# Patient Record
Sex: FEMALE | Race: BLACK OR AFRICAN AMERICAN | NOT HISPANIC OR LATINO | Employment: FULL TIME | URBAN - METROPOLITAN AREA
[De-identification: names, ages, dates, MRNs, and addresses within clinical notes are randomized per-mention and may not be internally consistent; named-entity substitution may affect disease eponyms.]

---

## 2021-11-05 ENCOUNTER — TRANSFERRED RECORDS (OUTPATIENT)
Dept: HEALTH INFORMATION MANAGEMENT | Facility: CLINIC | Age: 36
End: 2021-11-05

## 2021-11-05 LAB
HPV ABSTRACT: ABNORMAL
PAP-ABSTRACT: ABNORMAL

## 2022-02-10 LAB
HEP C HIM: NORMAL
HIV 1&2 EXT: NORMAL

## 2022-05-08 ENCOUNTER — TRANSFERRED RECORDS (OUTPATIENT)
Dept: MULTI SPECIALTY CLINIC | Facility: CLINIC | Age: 37
End: 2022-05-08

## 2022-05-08 LAB
ALT SERPL-CCNC: 11 U/L (ref 0–61)
AST SERPL-CCNC: 24 U/L (ref 5–34)
CREATININE (EXTERNAL): 0.8 MG/DL (ref 0.57–1.11)
GFR ESTIMATED (EXTERNAL): >90 ML/MIN/1.73M2
GLUCOSE (EXTERNAL): 84 MG/DL (ref 70–105)
POTASSIUM (EXTERNAL): 4.1 MMOL/L (ref 3.5–5.1)

## 2022-12-09 ENCOUNTER — TRANSFERRED RECORDS (OUTPATIENT)
Dept: HEALTH INFORMATION MANAGEMENT | Facility: CLINIC | Age: 37
End: 2022-12-09

## 2023-01-31 ENCOUNTER — TRANSFERRED RECORDS (OUTPATIENT)
Dept: HEALTH INFORMATION MANAGEMENT | Facility: CLINIC | Age: 38
End: 2023-01-31

## 2023-03-23 ENCOUNTER — VIRTUAL VISIT (OUTPATIENT)
Dept: EDUCATION SERVICES | Facility: CLINIC | Age: 38
End: 2023-03-23
Payer: COMMERCIAL

## 2023-03-23 ENCOUNTER — MEDICAL CORRESPONDENCE (OUTPATIENT)
Dept: HEALTH INFORMATION MANAGEMENT | Facility: CLINIC | Age: 38
End: 2023-03-23

## 2023-03-23 DIAGNOSIS — O24.419 GESTATIONAL DIABETES: Primary | ICD-10-CM

## 2023-03-23 PROCEDURE — G0108 DIAB MANAGE TRN  PER INDIV: HCPCS | Mod: GT | Performed by: DIETITIAN, REGISTERED

## 2023-03-23 NOTE — PROGRESS NOTES
Diabetes Self-Management Education & Support    SUBJECTIVE/OBJECTIVE:  Presents for education related to gestational diabetes via phone.  Accompanied by: Self  Gestational weeks: 25.5 week    Cultural Influences/Ethnic Background:  Not  or       Estimated Date of Delivery: Data Unavailable    1 hour OGTT  No results found for: GLU1  176    3 hour OGTT    Fasting  No results found for: GTTGF  93  1 hour  No results found for: GTTG1  219  2 hour  No results found for: GTTG2  186  3 hour  No results found for: GTTG3  72  Lifestyle and Health Behaviors:  Breakfast: usually she will have pc of toast with peanut butter or eggs with pc of toast 13 grams.  water.  she feels she goes too high if she has 30 grams.  snack: small apple 15-30 grams  Lunch: sandwich turkey two slices bread 26 grams, water, carrot on side.  was told 30 by [facility] educator.  Dinner: homemade tacos  or enchiladas.  chicken with salad with lots of veggies.  Snacks: evening snack: yogurt , cheese, fruit, peanuts, popcorn,  Other: on lantus 24 units at bedtime.  Has been on Lantus since february.  Just moved from [facility] and was following with diabetes education and provider there.      Current Management:   counting carbs, blood sugars, lantus 24 units at bedtime.    ASSESSMENT:  ASSESSMENT:  -pt just moved here from [facility] and her insurance is done the end of month and she is starting new job and insurance will start again in 1 month. Appt set for may 1 with diabetes education and she will call triage line if she has 3 or more elevated blood sugars or ketones in a week during the month of no insurance.  She has the triage phone number.  -she had gestational diabetes with first pregnancy as well.  Was on long acting insulin with that pregnancy as well.  Currently on Lantus 24 units at bedtime.  -Her numbers are all in range currently she stated under 90 in am and under 130 usually post meals.  -did have lower carb ranges that she  was given: was doing 15 grams bkfst and 30 at lunch, 45-60 supper.  She will work on 30 bkfst, 45-60 at lunch and supper and 15-30 at snacks. Making sure she eats the evening snack.        Educational topics covered today:  GDM diagnosis, pathophysiology, Risks and Complications of GDM, Means of controlling GDM, Blood Glucose Goals, Logging and Interpreting Glucose Results, Ketone Testing, When to Call a Diabetes Educator or OB Provider, Healthy Eating During Pregnancy, Counting Carbohydrates, Meal Planning for GDM, and Physical Activity    Educational materials provided today:   [place] Understanding Gestational Diabetes  GDM Log Book      Pt verbalized understanding of concepts discussed and recommendations provided today.     PLAN:  Check glucose 4 times daily, before breakfast and 1 hour after each meal.  Goals in am under 95 mg/dL, One hour post meal: under 140 mg/dL, if you miss the one hour a 2 hour would be under 120mg/dL.    Check Ketones daily for one week, if negative, reduce testing to once a week.     Physical activity recommended: try to do some walking right after meals if able.    Meal plan: 30 carbs at breakfast, 45-60 carbs at lunch, 45-60 carbs at supper, 15-30 carbs at 3 snacks a day.  Follow consistent CHO meal plan, eat CHO and protein/fat at all meals/snacks.    Call/[email or web address]-[email or web address] message diabetes educator if 3 or more blood sugars are above the goal in 1 week, if ketones are positive, or with questions/concerns.      Time Spent: 35 minutes  Encounter Type: Individual    Any diabetes medication dose changes were made via the CDE Protocol and Collaborative Practice Agreement with the patient's OB/GYN provider. A copy of this encounter was shared with the provider.

## 2023-03-23 NOTE — PATIENT INSTRUCTIONS
PLAN:  Check glucose 4 times daily, before breakfast and 1 hour after each meal.  Goals in am under 95 mg/dL, One hour post meal: under 140 mg/dL, if you miss the one hour a 2 hour would be under 120mg/dL.    Continue to give Lantus 24 units each night.    Check Ketones daily for one week, if negative, reduce testing to once a week.     Physical activity recommended: try to do some walking right after meals if able.    Meal plan: 30 carbs at breakfast, 45-60 carbs at lunch, 45-60 carbs at supper, 15-30 carbs at 3 snacks a day.  Follow consistent CHO meal plan, eat CHO and protein/fat at all meals/snacks.    Call/e-mail/Mr. Numbert message diabetes educator if 3 or more blood sugars are above the goal in 1 week, if ketones are positive, or with questions/concerns.  TRIAGE: [phone].      Here are the ideas for snacks:    Protein list (choose 2 = 14 g protein)  Carbohydrate list (choose 2 = 30 g carb*)   1 string cheese     1/2 whole grain English muffin  1/4 cup cottage cheese    1 slice whole grain bread  1 ounce cooked meat (the size    1/2 of a bun (hamburger or hot dog)  of a golf ball or meatball)   1 toaster waffle (no syrup)             1 ounce canned tuna or chicken   1/2 cup cooked oatmeal  2 breakfast sausage links    1/3 cup cooked brown rice or pasta  1 hot dog or veggie dog     5 Triscuit crackers  1.5 ounces cocktail shrimp (about   cup) 1/2 of a  Bagel Thin   no breading     1/2 of a whole wheat zehra  1 egg       1  6-inch flour tortilla  1/2 Boca or Sin original griller   1  6-inch or 2 4 -inch corn tortillas  burger (vegetarian/vegan)   1 fruit serving  3 ounces tofu     15 potato chips  1.5 ounces tempeh (about   cup)   10 corn tortilla chips  2 tablespoons nut butter    1/2 cup full fat ice cream  1/4 cup cup peanuts, almonds, pistachios,  5-6 ounces light or Greek yogurt  pumpkin seeds or 1/3 cup walnuts  3 cup popped popcorn  4 ounces (1/2 cup) [employer] brand milk  1/2 cup sweet  potato   8 ounces dairy milk    ~ *Check labels for total carb as flavors and brands may vary slightly.  ~ You can add a tablespoon of mayonnaise, mustard, cream cheese, margarine, sour cream, horseradish, carb-free salad dressing, hot sauce, or salsa for flavor.

## 2023-05-09 ENCOUNTER — TRANSCRIBE ORDERS (OUTPATIENT)
Dept: MATERNAL FETAL MEDICINE | Facility: CLINIC | Age: 38
End: 2023-05-09
Payer: COMMERCIAL

## 2023-05-09 ENCOUNTER — PRE VISIT (OUTPATIENT)
Dept: MATERNAL FETAL MEDICINE | Facility: CLINIC | Age: 38
End: 2023-05-09
Payer: COMMERCIAL

## 2023-05-09 DIAGNOSIS — O26.90 PREGNANCY RELATED CONDITION, ANTEPARTUM: Primary | ICD-10-CM

## 2023-05-16 ENCOUNTER — HOSPITAL ENCOUNTER (OUTPATIENT)
Dept: ULTRASOUND IMAGING | Facility: CLINIC | Age: 38
Discharge: HOME | End: 2023-05-16
Attending: STUDENT IN AN ORGANIZED HEALTH CARE EDUCATION/TRAINING PROGRAM
Payer: COMMERCIAL

## 2023-05-16 ENCOUNTER — OFFICE VISIT (OUTPATIENT)
Dept: MATERNAL FETAL MEDICINE | Facility: CLINIC | Age: 38
End: 2023-05-16
Attending: STUDENT IN AN ORGANIZED HEALTH CARE EDUCATION/TRAINING PROGRAM
Payer: COMMERCIAL

## 2023-05-16 DIAGNOSIS — O26.90 PREGNANCY RELATED CONDITION, ANTEPARTUM: ICD-10-CM

## 2023-05-16 DIAGNOSIS — O24.414 INSULIN CONTROLLED GESTATIONAL DIABETES MELLITUS (GDM) IN THIRD TRIMESTER: ICD-10-CM

## 2023-05-16 DIAGNOSIS — O36.5930 POOR CLINICAL FETAL GROWTH IN THIRD TRIMESTER, SINGLE OR UNSPECIFIED FETUS: Primary | ICD-10-CM

## 2023-05-16 PROCEDURE — 99203 OFFICE O/P NEW LOW 30 MIN: CPT | Mod: 25 | Performed by: OBSTETRICS & GYNECOLOGY

## 2023-05-16 PROCEDURE — 76819 FETAL BIOPHYS PROFIL W/O NST: CPT

## 2023-05-16 PROCEDURE — 76811 OB US DETAILED SNGL FETUS: CPT | Mod: 26 | Performed by: OBSTETRICS & GYNECOLOGY

## 2023-05-16 PROCEDURE — 76819 FETAL BIOPHYS PROFIL W/O NST: CPT | Mod: 26 | Performed by: OBSTETRICS & GYNECOLOGY

## 2023-05-16 PROCEDURE — 76811 OB US DETAILED SNGL FETUS: CPT

## 2023-05-16 NOTE — NURSING NOTE
Patient presents to M for L2. Positive fetal movement. Denies LOF, vaginal bleeding or cramping/contractions. SBAR given to MFMARILU MD, see their note in Epic.

## 2023-05-16 NOTE — PROGRESS NOTES
The patient was seen for an ultrasound in the M[facility]etal [facility] at the [facility] today.  For a detailed report of the ultrasound examination, please see the ultrasound report which can be found under the imaging tab.    If you have questions regarding today's evaluation or if we can be of further service, please contact the Maternal-Fetal Medicine Center.    Kenya Tsang MD  , OB/GYN  Maternal-Fetal Medicine  [phone] (Pager)

## 2023-05-21 ENCOUNTER — HEALTH MAINTENANCE LETTER (OUTPATIENT)
Age: 38
End: 2023-05-21

## 2023-06-08 ENCOUNTER — TELEPHONE (OUTPATIENT)
Dept: EDUCATION SERVICES | Facility: CLINIC | Age: 38
End: 2023-06-08
Payer: COMMERCIAL

## 2023-06-08 NOTE — TELEPHONE ENCOUNTER
Called patient to review BG, no answer. Left message with triage line and will send another TotalTakeoutt Message. We have reached out to this patient several times with no response, this will be our last outreach attempt but will gladly reconnect when patient is ready. Sending a message to OB team today (See communications).     PATY Mohan [facility]  Triage: [phone]  [phone]

## 2023-06-08 NOTE — LETTER
2023         RE: Mihaela Cespedes  [address]  [place] [facility] [place] [address]        Dr. Khan,    Just wanted you to be aware that we have not be able to connect with Mihaela for blood sugar review. We have reached out many times with no success. We will not be reaching out again, but will be ready to reconnect when the patient is ready.     Thanks,   PATY Mohan [facility]  Triage: [phone]  [phone]

## 2023-06-13 ENCOUNTER — HOSPITAL ENCOUNTER (OUTPATIENT)
Dept: ULTRASOUND IMAGING | Facility: CLINIC | Age: 38
Discharge: HOME | End: 2023-06-13
Attending: OBSTETRICS & GYNECOLOGY
Payer: COMMERCIAL

## 2023-06-13 ENCOUNTER — OFFICE VISIT (OUTPATIENT)
Dept: MATERNAL FETAL MEDICINE | Facility: CLINIC | Age: 38
End: 2023-06-13
Attending: OBSTETRICS & GYNECOLOGY
Payer: COMMERCIAL

## 2023-06-13 DIAGNOSIS — O10.913 CHRONIC HYPERTENSION IN OBSTETRIC CONTEXT, THIRD TRIMESTER: ICD-10-CM

## 2023-06-13 DIAGNOSIS — O24.414 INSULIN CONTROLLED WHITE CLASSIFICATION A2 GESTATIONAL DIABETES MELLITUS (GDM): ICD-10-CM

## 2023-06-13 DIAGNOSIS — O36.5930 POOR CLINICAL FETAL GROWTH IN THIRD TRIMESTER, SINGLE OR UNSPECIFIED FETUS: ICD-10-CM

## 2023-06-13 DIAGNOSIS — O99.213 MATERNAL OBESITY SYNDROME IN THIRD TRIMESTER: ICD-10-CM

## 2023-06-13 DIAGNOSIS — O09.523 MULTIGRAVIDA OF ADVANCED MATERNAL AGE IN THIRD TRIMESTER: Primary | ICD-10-CM

## 2023-06-13 PROCEDURE — 76816 OB US FOLLOW-UP PER FETUS: CPT

## 2023-06-13 PROCEDURE — 76819 FETAL BIOPHYS PROFIL W/O NST: CPT

## 2023-06-13 PROCEDURE — 76816 OB US FOLLOW-UP PER FETUS: CPT | Mod: 26 | Performed by: OBSTETRICS & GYNECOLOGY

## 2023-06-13 PROCEDURE — 76819 FETAL BIOPHYS PROFIL W/O NST: CPT | Mod: 26 | Performed by: OBSTETRICS & GYNECOLOGY

## 2023-06-13 NOTE — NURSING NOTE
Patient presents to [facility] for RL2/BPP at 37 weeks due to GDM A2, cHTN, and EFW 14% on previous US. Positive fetal movement. Denies LOF, vaginal bleeding or cramping/contractions. Patient states BS levels are within normal limits. SBAR given to M MD, see their note in Epic.

## 2023-06-13 NOTE — PROGRESS NOTES
Please refer to ultrasound report under 'Imaging' Studies of 'Chart Review' tabs.    Fabiano Chin M.D.

## 2023-07-03 ENCOUNTER — HOSPITAL ENCOUNTER (INPATIENT)
Facility: CLINIC | Age: 38
LOS: 3 days | Discharge: HOME | End: 2023-07-06
Attending: OBSTETRICS & GYNECOLOGY | Admitting: OBSTETRICS & GYNECOLOGY
Payer: COMMERCIAL

## 2023-07-03 DIAGNOSIS — O11.9 CHRONIC HYPERTENSION WITH SUPERIMPOSED PREECLAMPSIA: ICD-10-CM

## 2023-07-03 DIAGNOSIS — J06.9 UPPER RESPIRATORY TRACT INFECTION, UNSPECIFIED TYPE: ICD-10-CM

## 2023-07-03 PROBLEM — Z36.89 ENCOUNTER FOR TRIAGE IN PREGNANT PATIENT: Status: ACTIVE | Noted: 2023-07-03

## 2023-07-03 LAB
ABO/RH(D): NORMAL
ALBUMIN SERPL BCG-MCNC: 3.3 G/DL (ref 3.5–5.2)
ALP SERPL-CCNC: 112 U/L (ref 35–104)
ALT SERPL W P-5'-P-CCNC: 9 U/L (ref 0–50)
ANION GAP SERPL CALCULATED.3IONS-SCNC: 12 MMOL/L (ref 7–15)
ANTIBODY SCREEN: NEGATIVE
AST SERPL W P-5'-P-CCNC: 15 U/L (ref 0–45)
B-OH-BUTYR SERPL-SCNC: <0.18 MMOL/L
BILIRUB SERPL-MCNC: 0.2 MG/DL
BUN SERPL-MCNC: 9.8 MG/DL (ref 6–20)
CALCIUM SERPL-MCNC: 9.4 MG/DL (ref 8.6–10)
CHLORIDE SERPL-SCNC: 102 MMOL/L (ref 98–107)
CREAT SERPL-MCNC: 0.76 MG/DL (ref 0.51–0.95)
DEPRECATED HCO3 PLAS-SCNC: 21 MMOL/L (ref 22–29)
ERYTHROCYTE [DISTWIDTH] IN BLOOD BY AUTOMATED COUNT: 12.9 % (ref 10–15)
GFR SERPL CREATININE-BSD FRML MDRD: >90 ML/MIN/1.73M2
GLUCOSE BLDC GLUCOMTR-MCNC: 114 MG/DL (ref 70–99)
GLUCOSE SERPL-MCNC: 85 MG/DL (ref 70–99)
HCT VFR BLD AUTO: 36.1 % (ref 35–47)
HGB BLD-MCNC: 12 G/DL (ref 11.7–15.7)
MCH RBC QN AUTO: 27.4 PG (ref 26.5–33)
MCHC RBC AUTO-ENTMCNC: 33.2 G/DL (ref 31.5–36.5)
MCV RBC AUTO: 82 FL (ref 78–100)
PLATELET # BLD AUTO: 259 10E3/UL (ref 150–450)
POTASSIUM SERPL-SCNC: 3.9 MMOL/L (ref 3.4–5.3)
PROT SERPL-MCNC: 7.2 G/DL (ref 6.4–8.3)
RBC # BLD AUTO: 4.38 10E6/UL (ref 3.8–5.2)
SODIUM SERPL-SCNC: 135 MMOL/L (ref 136–145)
SPECIMEN EXPIRATION DATE: NORMAL
WBC # BLD AUTO: 9.8 10E3/UL (ref 4–11)

## 2023-07-03 PROCEDURE — 250N000011 HC RX IP 250 OP 636: Mod: JZ | Performed by: OBSTETRICS & GYNECOLOGY

## 2023-07-03 PROCEDURE — 80053 COMPREHEN METABOLIC PANEL: CPT | Performed by: OBSTETRICS & GYNECOLOGY

## 2023-07-03 PROCEDURE — 86850 RBC ANTIBODY SCREEN: CPT | Performed by: OBSTETRICS & GYNECOLOGY

## 2023-07-03 PROCEDURE — 86901 BLOOD TYPING SEROLOGIC RH(D): CPT | Performed by: OBSTETRICS & GYNECOLOGY

## 2023-07-03 PROCEDURE — 85027 COMPLETE CBC AUTOMATED: CPT | Performed by: OBSTETRICS & GYNECOLOGY

## 2023-07-03 PROCEDURE — 250N000011 HC RX IP 250 OP 636: Mod: JZ

## 2023-07-03 PROCEDURE — 82010 KETONE BODYS QUAN: CPT | Performed by: OBSTETRICS & GYNECOLOGY

## 2023-07-03 PROCEDURE — 86780 TREPONEMA PALLIDUM: CPT | Performed by: OBSTETRICS & GYNECOLOGY

## 2023-07-03 PROCEDURE — 120N000001 HC R&B MED SURG/OB

## 2023-07-03 PROCEDURE — 36415 COLL VENOUS BLD VENIPUNCTURE: CPT | Performed by: OBSTETRICS & GYNECOLOGY

## 2023-07-03 PROCEDURE — 258N000003 HC RX IP 258 OP 636: Performed by: OBSTETRICS & GYNECOLOGY

## 2023-07-03 PROCEDURE — G0463 HOSPITAL OUTPT CLINIC VISIT: HCPCS

## 2023-07-03 PROCEDURE — 99207 PR NO CHARGE LOS: CPT | Performed by: PHYSICIAN ASSISTANT

## 2023-07-03 RX ORDER — SODIUM CHLORIDE, SODIUM LACTATE, POTASSIUM CHLORIDE, CALCIUM CHLORIDE 600; 310; 30; 20 MG/100ML; MG/100ML; MG/100ML; MG/100ML
INJECTION, SOLUTION INTRAVENOUS CONTINUOUS
Status: DISCONTINUED | OUTPATIENT
Start: 2023-07-03 | End: 2023-07-04 | Stop reason: HOSPADM

## 2023-07-03 RX ORDER — OXYTOCIN 10 [USP'U]/ML
10 INJECTION, SOLUTION INTRAMUSCULAR; INTRAVENOUS
Status: DISCONTINUED | OUTPATIENT
Start: 2023-07-03 | End: 2023-07-04 | Stop reason: HOSPADM

## 2023-07-03 RX ORDER — NICOTINE POLACRILEX 4 MG
15-30 LOZENGE BUCCAL
Status: DISCONTINUED | OUTPATIENT
Start: 2023-07-03 | End: 2023-07-04 | Stop reason: HOSPADM

## 2023-07-03 RX ORDER — FENTANYL CITRATE 50 UG/ML
100 INJECTION, SOLUTION INTRAMUSCULAR; INTRAVENOUS
Status: DISCONTINUED | OUTPATIENT
Start: 2023-07-03 | End: 2023-07-04 | Stop reason: HOSPADM

## 2023-07-03 RX ORDER — ONDANSETRON 2 MG/ML
4 INJECTION INTRAMUSCULAR; INTRAVENOUS EVERY 6 HOURS PRN
Status: DISCONTINUED | OUTPATIENT
Start: 2023-07-03 | End: 2023-07-04 | Stop reason: HOSPADM

## 2023-07-03 RX ORDER — OXYTOCIN/0.9 % SODIUM CHLORIDE 30/500 ML
340 PLASTIC BAG, INJECTION (ML) INTRAVENOUS CONTINUOUS PRN
Status: DISCONTINUED | OUTPATIENT
Start: 2023-07-03 | End: 2023-07-04 | Stop reason: HOSPADM

## 2023-07-03 RX ORDER — LIDOCAINE 40 MG/G
CREAM TOPICAL
Status: DISCONTINUED | OUTPATIENT
Start: 2023-07-03 | End: 2023-07-04 | Stop reason: HOSPADM

## 2023-07-03 RX ORDER — CALCIUM GLUCONATE 94 MG/ML
1 INJECTION, SOLUTION INTRAVENOUS
Status: DISCONTINUED | OUTPATIENT
Start: 2023-07-03 | End: 2023-07-06 | Stop reason: HOSPADM

## 2023-07-03 RX ORDER — METOCLOPRAMIDE HYDROCHLORIDE 5 MG/ML
10 INJECTION INTRAMUSCULAR; INTRAVENOUS EVERY 6 HOURS PRN
Status: DISCONTINUED | OUTPATIENT
Start: 2023-07-03 | End: 2023-07-04 | Stop reason: HOSPADM

## 2023-07-03 RX ORDER — KETOROLAC TROMETHAMINE 30 MG/ML
30 INJECTION, SOLUTION INTRAMUSCULAR; INTRAVENOUS
Status: DISCONTINUED | OUTPATIENT
Start: 2023-07-03 | End: 2023-07-04

## 2023-07-03 RX ORDER — ASPIRIN 81 MG/1
162 TABLET ORAL DAILY
Status: ON HOLD | COMMUNITY
End: 2023-07-06

## 2023-07-03 RX ORDER — NALOXONE HYDROCHLORIDE 0.4 MG/ML
0.4 INJECTION, SOLUTION INTRAMUSCULAR; INTRAVENOUS; SUBCUTANEOUS
Status: DISCONTINUED | OUTPATIENT
Start: 2023-07-03 | End: 2023-07-04 | Stop reason: HOSPADM

## 2023-07-03 RX ORDER — MAGNESIUM SULFATE IN WATER 40 MG/ML
2 INJECTION, SOLUTION INTRAVENOUS CONTINUOUS
Status: DISCONTINUED | OUTPATIENT
Start: 2023-07-03 | End: 2023-07-04

## 2023-07-03 RX ORDER — ONDANSETRON 4 MG/1
4 TABLET, ORALLY DISINTEGRATING ORAL EVERY 6 HOURS PRN
Status: DISCONTINUED | OUTPATIENT
Start: 2023-07-03 | End: 2023-07-04 | Stop reason: HOSPADM

## 2023-07-03 RX ORDER — PROCHLORPERAZINE MALEATE 5 MG
10 TABLET ORAL EVERY 6 HOURS PRN
Status: DISCONTINUED | OUTPATIENT
Start: 2023-07-03 | End: 2023-07-04 | Stop reason: HOSPADM

## 2023-07-03 RX ORDER — OXYTOCIN/0.9 % SODIUM CHLORIDE 30/500 ML
100-340 PLASTIC BAG, INJECTION (ML) INTRAVENOUS CONTINUOUS PRN
Status: DISCONTINUED | OUTPATIENT
Start: 2023-07-03 | End: 2023-07-04

## 2023-07-03 RX ORDER — LABETALOL HYDROCHLORIDE 5 MG/ML
INJECTION, SOLUTION INTRAVENOUS
Status: COMPLETED
Start: 2023-07-03 | End: 2023-07-03

## 2023-07-03 RX ORDER — MISOPROSTOL 200 UG/1
400 TABLET ORAL
Status: DISCONTINUED | OUTPATIENT
Start: 2023-07-03 | End: 2023-07-04 | Stop reason: HOSPADM

## 2023-07-03 RX ORDER — LABETALOL HYDROCHLORIDE 5 MG/ML
20-80 INJECTION, SOLUTION INTRAVENOUS EVERY 10 MIN PRN
Status: DISCONTINUED | OUTPATIENT
Start: 2023-07-03 | End: 2023-07-06 | Stop reason: HOSPADM

## 2023-07-03 RX ORDER — MAGNESIUM SULFATE HEPTAHYDRATE 40 MG/ML
4 INJECTION, SOLUTION INTRAVENOUS ONCE
Status: COMPLETED | OUTPATIENT
Start: 2023-07-03 | End: 2023-07-03

## 2023-07-03 RX ORDER — METOCLOPRAMIDE 10 MG/1
10 TABLET ORAL EVERY 6 HOURS PRN
Status: DISCONTINUED | OUTPATIENT
Start: 2023-07-03 | End: 2023-07-04 | Stop reason: HOSPADM

## 2023-07-03 RX ORDER — OXYTOCIN 10 [USP'U]/ML
10 INJECTION, SOLUTION INTRAMUSCULAR; INTRAVENOUS
Status: DISCONTINUED | OUTPATIENT
Start: 2023-07-03 | End: 2023-07-04

## 2023-07-03 RX ORDER — DEXTROSE MONOHYDRATE 25 G/50ML
25-50 INJECTION, SOLUTION INTRAVENOUS
Status: DISCONTINUED | OUTPATIENT
Start: 2023-07-03 | End: 2023-07-04 | Stop reason: HOSPADM

## 2023-07-03 RX ORDER — HYDRALAZINE HYDROCHLORIDE 20 MG/ML
10 INJECTION INTRAMUSCULAR; INTRAVENOUS
Status: DISCONTINUED | OUTPATIENT
Start: 2023-07-03 | End: 2023-07-06 | Stop reason: HOSPADM

## 2023-07-03 RX ORDER — IBUPROFEN 400 MG/1
800 TABLET, FILM COATED ORAL
Status: DISCONTINUED | OUTPATIENT
Start: 2023-07-03 | End: 2023-07-04

## 2023-07-03 RX ORDER — PRENATAL VIT/IRON FUM/FOLIC AC 27MG-0.8MG
TABLET ORAL DAILY
COMMUNITY

## 2023-07-03 RX ORDER — CITRIC ACID/SODIUM CITRATE 334-500MG
30 SOLUTION, ORAL ORAL
Status: DISCONTINUED | OUTPATIENT
Start: 2023-07-03 | End: 2023-07-04 | Stop reason: HOSPADM

## 2023-07-03 RX ORDER — SODIUM CHLORIDE 9 MG/ML
INJECTION, SOLUTION INTRAVENOUS CONTINUOUS
Status: DISCONTINUED | OUTPATIENT
Start: 2023-07-03 | End: 2023-07-04 | Stop reason: HOSPADM

## 2023-07-03 RX ORDER — CARBOPROST TROMETHAMINE 250 UG/ML
250 INJECTION, SOLUTION INTRAMUSCULAR
Status: DISCONTINUED | OUTPATIENT
Start: 2023-07-03 | End: 2023-07-04 | Stop reason: HOSPADM

## 2023-07-03 RX ORDER — NALOXONE HYDROCHLORIDE 0.4 MG/ML
0.2 INJECTION, SOLUTION INTRAMUSCULAR; INTRAVENOUS; SUBCUTANEOUS
Status: DISCONTINUED | OUTPATIENT
Start: 2023-07-03 | End: 2023-07-04 | Stop reason: HOSPADM

## 2023-07-03 RX ORDER — TRANEXAMIC ACID 10 MG/ML
1 INJECTION, SOLUTION INTRAVENOUS EVERY 30 MIN PRN
Status: DISCONTINUED | OUTPATIENT
Start: 2023-07-03 | End: 2023-07-04 | Stop reason: HOSPADM

## 2023-07-03 RX ORDER — MISOPROSTOL 200 UG/1
800 TABLET ORAL
Status: DISCONTINUED | OUTPATIENT
Start: 2023-07-03 | End: 2023-07-04 | Stop reason: HOSPADM

## 2023-07-03 RX ORDER — PROCHLORPERAZINE 25 MG
25 SUPPOSITORY, RECTAL RECTAL EVERY 12 HOURS PRN
Status: DISCONTINUED | OUTPATIENT
Start: 2023-07-03 | End: 2023-07-04 | Stop reason: HOSPADM

## 2023-07-03 RX ORDER — METHYLERGONOVINE MALEATE 0.2 MG/ML
200 INJECTION INTRAVENOUS
Status: DISCONTINUED | OUTPATIENT
Start: 2023-07-03 | End: 2023-07-04 | Stop reason: HOSPADM

## 2023-07-03 RX ORDER — MAGNESIUM SULFATE HEPTAHYDRATE 40 MG/ML
2 INJECTION, SOLUTION INTRAVENOUS
Status: DISCONTINUED | OUTPATIENT
Start: 2023-07-03 | End: 2023-07-06 | Stop reason: HOSPADM

## 2023-07-03 RX ORDER — LIDOCAINE 40 MG/G
CREAM TOPICAL
Status: DISCONTINUED | OUTPATIENT
Start: 2023-07-03 | End: 2023-07-03 | Stop reason: HOSPADM

## 2023-07-03 RX ADMIN — LABETALOL HYDROCHLORIDE 20 MG: 5 INJECTION, SOLUTION INTRAVENOUS at 18:44

## 2023-07-03 RX ADMIN — MAGNESIUM SULFATE HEPTAHYDRATE 4 G: 40 INJECTION, SOLUTION INTRAVENOUS at 18:50

## 2023-07-03 RX ADMIN — MAGNESIUM SULFATE IN WATER 2 G/HR: 40 INJECTION, SOLUTION INTRAVENOUS at 19:20

## 2023-07-03 RX ADMIN — SODIUM CHLORIDE, POTASSIUM CHLORIDE, SODIUM LACTATE AND CALCIUM CHLORIDE: 600; 310; 30; 20 INJECTION, SOLUTION INTRAVENOUS at 19:20

## 2023-07-03 ASSESSMENT — ACTIVITIES OF DAILY LIVING (ADL)
ADLS_ACUITY_SCORE: 20
ADLS_ACUITY_SCORE: 35
ADLS_ACUITY_SCORE: 20
ADLS_ACUITY_SCORE: 20

## 2023-07-03 NOTE — PLAN OF CARE
Data: Patient admitted to room 2220 at 1745. Patient is a . Prenatal record reviewed.   OB History    Para Term  AB Living   2 1 1 0 0 1   SAB IAB Ectopic Multiple Live Births   0 0 0 0 1      # Outcome Date GA Lbr Randolph/2nd Weight Sex Delivery Anes PTL Lv   2 Current            1 Term 22     Vag-Spont   GUSTAVO   .  Medical History:   Past Medical History:   Diagnosis Date     Diabetes (H)      Hypertension    .  Gestational age 39w6d. Vital signs per doc flowsheet. Fetal movement present. Patient reports Rule Out Labor   as reason for admission. Support persons Ang present.  Action: Report from Francy GONZALEZ RN obtained at 1745. Care of patient assumed at 1745. Verbal consent for EFM, external fetal monitors applied. Admission assessment completed. Patient and support persons educated on labor process. Patient instructed to report change in fetal movement, contractions, vaginal leaking of fluid or bleeding, abdominal pain, or any concerns related to the pregnancy to her nurse/physician. Patient oriented to room, call light in reach.   Response: Dr. Espinosa informed of admission. Plan per provider is continue to labor. Patient verbalized understanding of education and verbalized agreement with plan. Patient coping with labor via Breathing thru contractions , epidural if needed.    Goal Outcome Evaluation:

## 2023-07-03 NOTE — PLAN OF CARE
Pt is a  at 39.6 weeks who presents vida every 5 min since 1400 today.  Pt states the contractions began at 0400 but became regular and 5 min apart at 1400. Pt is breathing through contractions.  Denies any leaking of fluid or vaginal bleeding.  +FM.  Monitors applied after obtaining verbal consent.  Pt is gestational diabetic taking lantus at bedtime.  States her fasting BS are 70-80's in am and post meals are 120's.  Pt also has gestational hypertension, 140/90 and 130's/80 upon arrival, Dr Espinosa aware, orders received for admission.  Pt is 4-5/80/-2 with a bulging bag.  Plan to admit pt to 220 for further labor cares.  Report given to Kassy GARCIA RN at 1740.

## 2023-07-04 ENCOUNTER — ANESTHESIA (OUTPATIENT)
Dept: OBGYN | Facility: CLINIC | Age: 38
End: 2023-07-04
Payer: COMMERCIAL

## 2023-07-04 ENCOUNTER — ANESTHESIA EVENT (OUTPATIENT)
Dept: OBGYN | Facility: CLINIC | Age: 38
End: 2023-07-04
Payer: COMMERCIAL

## 2023-07-04 LAB
GLUCOSE BLDC GLUCOMTR-MCNC: 114 MG/DL (ref 70–99)
GLUCOSE BLDC GLUCOMTR-MCNC: 119 MG/DL (ref 70–99)
GLUCOSE BLDC GLUCOMTR-MCNC: 124 MG/DL (ref 70–99)
GLUCOSE BLDC GLUCOMTR-MCNC: 127 MG/DL (ref 70–99)
GLUCOSE BLDC GLUCOMTR-MCNC: 98 MG/DL (ref 70–99)

## 2023-07-04 PROCEDURE — 722N000001 HC LABOR CARE VAGINAL DELIVERY SINGLE

## 2023-07-04 PROCEDURE — 120N000012 HC R&B POSTPARTUM

## 2023-07-04 PROCEDURE — 99207 PR NO CHARGE LOS: CPT

## 2023-07-04 PROCEDURE — 10907ZC DRAINAGE OF AMNIOTIC FLUID, THERAPEUTIC FROM PRODUCTS OF CONCEPTION, VIA NATURAL OR ARTIFICIAL OPENING: ICD-10-PCS | Performed by: OBSTETRICS & GYNECOLOGY

## 2023-07-04 PROCEDURE — 258N000003 HC RX IP 258 OP 636: Performed by: ANESTHESIOLOGY

## 2023-07-04 PROCEDURE — 250N000011 HC RX IP 250 OP 636: Mod: JZ | Performed by: OBSTETRICS & GYNECOLOGY

## 2023-07-04 PROCEDURE — 250N000013 HC RX MED GY IP 250 OP 250 PS 637: Performed by: OBSTETRICS & GYNECOLOGY

## 2023-07-04 PROCEDURE — 250N000011 HC RX IP 250 OP 636: Performed by: ANESTHESIOLOGY

## 2023-07-04 PROCEDURE — 3E0R3BZ INTRODUCTION OF ANESTHETIC AGENT INTO SPINAL CANAL, PERCUTANEOUS APPROACH: ICD-10-PCS | Performed by: OBSTETRICS & GYNECOLOGY

## 2023-07-04 PROCEDURE — 00HU33Z INSERTION OF INFUSION DEVICE INTO SPINAL CANAL, PERCUTANEOUS APPROACH: ICD-10-PCS | Performed by: OBSTETRICS & GYNECOLOGY

## 2023-07-04 PROCEDURE — 258N000003 HC RX IP 258 OP 636: Performed by: OBSTETRICS & GYNECOLOGY

## 2023-07-04 PROCEDURE — 250N000012 HC RX MED GY IP 250 OP 636 PS 637: Performed by: OBSTETRICS & GYNECOLOGY

## 2023-07-04 PROCEDURE — 250N000009 HC RX 250: Performed by: ANESTHESIOLOGY

## 2023-07-04 PROCEDURE — 370N000003 HC ANESTHESIA WARD SERVICE: Performed by: ANESTHESIOLOGY

## 2023-07-04 PROCEDURE — 0KQM0ZZ REPAIR PERINEUM MUSCLE, OPEN APPROACH: ICD-10-PCS | Performed by: OBSTETRICS & GYNECOLOGY

## 2023-07-04 PROCEDURE — 250N000009 HC RX 250: Performed by: OBSTETRICS & GYNECOLOGY

## 2023-07-04 PROCEDURE — 250N000013 HC RX MED GY IP 250 OP 250 PS 637: Performed by: STUDENT IN AN ORGANIZED HEALTH CARE EDUCATION/TRAINING PROGRAM

## 2023-07-04 RX ORDER — HYDROCORTISONE 25 MG/G
CREAM TOPICAL 3 TIMES DAILY PRN
Status: DISCONTINUED | OUTPATIENT
Start: 2023-07-04 | End: 2023-07-06 | Stop reason: HOSPADM

## 2023-07-04 RX ORDER — DEXTROSE MONOHYDRATE 25 G/50ML
25-50 INJECTION, SOLUTION INTRAVENOUS
Status: DISCONTINUED | OUTPATIENT
Start: 2023-07-04 | End: 2023-07-06 | Stop reason: HOSPADM

## 2023-07-04 RX ORDER — OXYTOCIN 10 [USP'U]/ML
10 INJECTION, SOLUTION INTRAMUSCULAR; INTRAVENOUS
Status: DISCONTINUED | OUTPATIENT
Start: 2023-07-04 | End: 2023-07-06 | Stop reason: HOSPADM

## 2023-07-04 RX ORDER — SODIUM CHLORIDE, SODIUM LACTATE, POTASSIUM CHLORIDE, CALCIUM CHLORIDE 600; 310; 30; 20 MG/100ML; MG/100ML; MG/100ML; MG/100ML
INJECTION, SOLUTION INTRAVENOUS CONTINUOUS
Status: DISCONTINUED | OUTPATIENT
Start: 2023-07-04 | End: 2023-07-06 | Stop reason: HOSPADM

## 2023-07-04 RX ORDER — NIFEDIPINE 30 MG/1
30 TABLET, EXTENDED RELEASE ORAL DAILY
Status: DISCONTINUED | OUTPATIENT
Start: 2023-07-04 | End: 2023-07-06 | Stop reason: HOSPADM

## 2023-07-04 RX ORDER — FENTANYL CITRATE-0.9 % NACL/PF 10 MCG/ML
100 PLASTIC BAG, INJECTION (ML) INTRAVENOUS EVERY 5 MIN PRN
Status: COMPLETED | OUTPATIENT
Start: 2023-07-04 | End: 2023-07-04

## 2023-07-04 RX ORDER — BISACODYL 10 MG
10 SUPPOSITORY, RECTAL RECTAL DAILY PRN
Status: DISCONTINUED | OUTPATIENT
Start: 2023-07-04 | End: 2023-07-06 | Stop reason: HOSPADM

## 2023-07-04 RX ORDER — NICOTINE POLACRILEX 4 MG
15-30 LOZENGE BUCCAL
Status: DISCONTINUED | OUTPATIENT
Start: 2023-07-04 | End: 2023-07-04

## 2023-07-04 RX ORDER — NALBUPHINE HYDROCHLORIDE 20 MG/ML
2.5-5 INJECTION, SOLUTION INTRAMUSCULAR; INTRAVENOUS; SUBCUTANEOUS EVERY 6 HOURS PRN
Status: DISCONTINUED | OUTPATIENT
Start: 2023-07-04 | End: 2023-07-06 | Stop reason: HOSPADM

## 2023-07-04 RX ORDER — NICOTINE POLACRILEX 4 MG
15-30 LOZENGE BUCCAL
Status: DISCONTINUED | OUTPATIENT
Start: 2023-07-04 | End: 2023-07-06 | Stop reason: HOSPADM

## 2023-07-04 RX ORDER — MAGNESIUM SULFATE IN WATER 40 MG/ML
2 INJECTION, SOLUTION INTRAVENOUS CONTINUOUS
Status: DISPENSED | OUTPATIENT
Start: 2023-07-04 | End: 2023-07-05

## 2023-07-04 RX ORDER — METHYLERGONOVINE MALEATE 0.2 MG/ML
200 INJECTION INTRAVENOUS
Status: DISCONTINUED | OUTPATIENT
Start: 2023-07-04 | End: 2023-07-06 | Stop reason: HOSPADM

## 2023-07-04 RX ORDER — OXYTOCIN/0.9 % SODIUM CHLORIDE 30/500 ML
340 PLASTIC BAG, INJECTION (ML) INTRAVENOUS CONTINUOUS PRN
Status: DISCONTINUED | OUTPATIENT
Start: 2023-07-04 | End: 2023-07-06 | Stop reason: HOSPADM

## 2023-07-04 RX ORDER — TRANEXAMIC ACID 10 MG/ML
1 INJECTION, SOLUTION INTRAVENOUS EVERY 30 MIN PRN
Status: DISCONTINUED | OUTPATIENT
Start: 2023-07-04 | End: 2023-07-06 | Stop reason: HOSPADM

## 2023-07-04 RX ORDER — MODIFIED LANOLIN
OINTMENT (GRAM) TOPICAL
Status: DISCONTINUED | OUTPATIENT
Start: 2023-07-04 | End: 2023-07-06 | Stop reason: HOSPADM

## 2023-07-04 RX ORDER — ACETAMINOPHEN 325 MG/1
650 TABLET ORAL EVERY 4 HOURS PRN
Status: DISCONTINUED | OUTPATIENT
Start: 2023-07-04 | End: 2023-07-06 | Stop reason: HOSPADM

## 2023-07-04 RX ORDER — DEXTROSE MONOHYDRATE 25 G/50ML
25-50 INJECTION, SOLUTION INTRAVENOUS
Status: DISCONTINUED | OUTPATIENT
Start: 2023-07-04 | End: 2023-07-04

## 2023-07-04 RX ORDER — IBUPROFEN 400 MG/1
800 TABLET, FILM COATED ORAL EVERY 6 HOURS PRN
Status: DISCONTINUED | OUTPATIENT
Start: 2023-07-04 | End: 2023-07-06 | Stop reason: HOSPADM

## 2023-07-04 RX ORDER — DOCUSATE SODIUM 100 MG/1
100 CAPSULE, LIQUID FILLED ORAL DAILY
Status: DISCONTINUED | OUTPATIENT
Start: 2023-07-04 | End: 2023-07-06 | Stop reason: HOSPADM

## 2023-07-04 RX ORDER — ONDANSETRON 4 MG/1
4 TABLET, ORALLY DISINTEGRATING ORAL EVERY 6 HOURS PRN
Status: DISCONTINUED | OUTPATIENT
Start: 2023-07-04 | End: 2023-07-06 | Stop reason: HOSPADM

## 2023-07-04 RX ORDER — EPHEDRINE SULFATE 50 MG/ML
5 INJECTION, SOLUTION INTRAMUSCULAR; INTRAVENOUS; SUBCUTANEOUS
Status: DISCONTINUED | OUTPATIENT
Start: 2023-07-04 | End: 2023-07-06 | Stop reason: HOSPADM

## 2023-07-04 RX ORDER — OXYTOCIN/0.9 % SODIUM CHLORIDE 30/500 ML
1-24 PLASTIC BAG, INJECTION (ML) INTRAVENOUS CONTINUOUS
Status: DISCONTINUED | OUTPATIENT
Start: 2023-07-04 | End: 2023-07-04 | Stop reason: HOSPADM

## 2023-07-04 RX ORDER — ONDANSETRON 2 MG/ML
4 INJECTION INTRAMUSCULAR; INTRAVENOUS EVERY 6 HOURS PRN
Status: DISCONTINUED | OUTPATIENT
Start: 2023-07-04 | End: 2023-07-06 | Stop reason: HOSPADM

## 2023-07-04 RX ORDER — CARBOPROST TROMETHAMINE 250 UG/ML
250 INJECTION, SOLUTION INTRAMUSCULAR
Status: DISCONTINUED | OUTPATIENT
Start: 2023-07-04 | End: 2023-07-06 | Stop reason: HOSPADM

## 2023-07-04 RX ORDER — MISOPROSTOL 200 UG/1
400 TABLET ORAL
Status: DISCONTINUED | OUTPATIENT
Start: 2023-07-04 | End: 2023-07-06 | Stop reason: HOSPADM

## 2023-07-04 RX ORDER — SODIUM CHLORIDE, SODIUM LACTATE, POTASSIUM CHLORIDE, CALCIUM CHLORIDE 600; 310; 30; 20 MG/100ML; MG/100ML; MG/100ML; MG/100ML
INJECTION, SOLUTION INTRAVENOUS CONTINUOUS PRN
Status: DISCONTINUED | OUTPATIENT
Start: 2023-07-04 | End: 2023-07-04 | Stop reason: HOSPADM

## 2023-07-04 RX ORDER — MISOPROSTOL 200 UG/1
800 TABLET ORAL
Status: DISCONTINUED | OUTPATIENT
Start: 2023-07-04 | End: 2023-07-06 | Stop reason: HOSPADM

## 2023-07-04 RX ADMIN — EPHEDRINE SULFATE 5 MG: 5 INJECTION INTRAVENOUS at 02:23

## 2023-07-04 RX ADMIN — DOCUSATE SODIUM 100 MG: 100 CAPSULE, LIQUID FILLED ORAL at 08:05

## 2023-07-04 RX ADMIN — INSULIN ASPART 1 UNITS: 100 INJECTION, SOLUTION INTRAVENOUS; SUBCUTANEOUS at 04:05

## 2023-07-04 RX ADMIN — BENZOCAINE: 11.4 AEROSOL, SPRAY TOPICAL at 06:16

## 2023-07-04 RX ADMIN — IBUPROFEN 800 MG: 400 TABLET ORAL at 11:58

## 2023-07-04 RX ADMIN — Medication 100 MCG: at 01:14

## 2023-07-04 RX ADMIN — IBUPROFEN 800 MG: 400 TABLET ORAL at 06:17

## 2023-07-04 RX ADMIN — MAGNESIUM SULFATE IN WATER 2 G/HR: 40 INJECTION, SOLUTION INTRAVENOUS at 08:05

## 2023-07-04 RX ADMIN — SODIUM CHLORIDE, POTASSIUM CHLORIDE, SODIUM LACTATE AND CALCIUM CHLORIDE 500 ML: 600; 310; 30; 20 INJECTION, SOLUTION INTRAVENOUS at 02:01

## 2023-07-04 RX ADMIN — EPHEDRINE SULFATE 5 MG: 5 INJECTION INTRAVENOUS at 02:30

## 2023-07-04 RX ADMIN — ACETAMINOPHEN 650 MG: 325 TABLET, FILM COATED ORAL at 11:58

## 2023-07-04 RX ADMIN — Medication: at 00:34

## 2023-07-04 RX ADMIN — SODIUM CHLORIDE, POTASSIUM CHLORIDE, SODIUM LACTATE AND CALCIUM CHLORIDE: 600; 310; 30; 20 INJECTION, SOLUTION INTRAVENOUS at 00:37

## 2023-07-04 RX ADMIN — ACETAMINOPHEN 650 MG: 325 TABLET, FILM COATED ORAL at 17:49

## 2023-07-04 RX ADMIN — MAGNESIUM SULFATE IN WATER 2 G/HR: 40 INJECTION, SOLUTION INTRAVENOUS at 17:18

## 2023-07-04 RX ADMIN — ACETAMINOPHEN 650 MG: 325 TABLET, FILM COATED ORAL at 22:06

## 2023-07-04 RX ADMIN — Medication 100 MCG: at 01:46

## 2023-07-04 RX ADMIN — Medication 2 MILLI-UNITS/MIN: at 03:11

## 2023-07-04 RX ADMIN — EPHEDRINE SULFATE 5 MG: 5 INJECTION INTRAVENOUS at 04:46

## 2023-07-04 RX ADMIN — ACETAMINOPHEN 650 MG: 325 TABLET, FILM COATED ORAL at 06:17

## 2023-07-04 RX ADMIN — NIFEDIPINE 30 MG: 30 TABLET, FILM COATED, EXTENDED RELEASE ORAL at 23:03

## 2023-07-04 RX ADMIN — Medication 100 MCG: at 01:34

## 2023-07-04 RX ADMIN — SODIUM CHLORIDE, POTASSIUM CHLORIDE, SODIUM LACTATE AND CALCIUM CHLORIDE: 600; 310; 30; 20 INJECTION, SOLUTION INTRAVENOUS at 08:07

## 2023-07-04 RX ADMIN — IBUPROFEN 800 MG: 400 TABLET ORAL at 17:49

## 2023-07-04 RX ADMIN — EPHEDRINE SULFATE 5 MG: 5 INJECTION INTRAVENOUS at 02:01

## 2023-07-04 RX ADMIN — METOCLOPRAMIDE 10 MG: 5 INJECTION, SOLUTION INTRAMUSCULAR; INTRAVENOUS at 02:24

## 2023-07-04 RX ADMIN — Medication 100 MCG: at 01:29

## 2023-07-04 ASSESSMENT — ACTIVITIES OF DAILY LIVING (ADL)
ADLS_ACUITY_SCORE: 20
ADLS_ACUITY_SCORE: 24
ADLS_ACUITY_SCORE: 24
ADLS_ACUITY_SCORE: 20
ADLS_ACUITY_SCORE: 24

## 2023-07-04 NOTE — PLAN OF CARE
Dr Espinosa updated on pt getting the epidural, SVE, pt refusing to get BS done at 0100 and 0200. Updated MD on previous blood sugars and with the first having to start the process of an insulin infusion but then pt's blood sugar came down to 98. Pt stated that she wants to wait to start the second IV if her sugars go high. MD stated the importance of sugar checks. Pt stated understanding and will get a sugar checked at 0230

## 2023-07-04 NOTE — PROGRESS NOTES
Data: Mihaela Cespedes transferred to [facility] via wheelchair at 1100. Baby transferred via parent's arms.  Action: Receiving unit notified of transfer: Yes. Patient and family notified of room change. Belongings sent to receiving unit. Accompanied by Registered Nurse. Oriented patient to surroundings. Call light within reach.   Response: Patient tolerated transfer and is stable.

## 2023-07-04 NOTE — LACTATION NOTE
Initial visit. Breast and bottle feeding Similac 15ml.  LC placed baby to breast on the left breast in cradle hold and baby latched well with lips flanged.  Nutritive suckling pattern noted for 10 minutes and then LC set up and instructed on how to use the hospital grade breast pump.  Hand expression done and no gtts seen.    Breastfeeding general information reviewed.   Advised to breastfeed exclusively, on demand, avoid pacifiers, bottles and formula unless medically indicated.  Encouraged rooming in, skin to skin, feeding on demand 8-12x/day or sooner if baby cues.  Explained benefits of holding and skin to skin.  Encouraged lots of skin to skin. Instructed on hand expression. History of low milk supply and mother expresses strong desire to breast feed.  Plan is to breastfeed on one breast for 10-15 minutes, then pump for 15 minutes, while baby is being bottle fed.    Continues to nurse well per mom. No further questions at this time.   Will follow as needed.   Carina LIRAN, RN, PHN, RNC-MNN, IBCLC

## 2023-07-04 NOTE — ANESTHESIA PREPROCEDURE EVALUATION
Anesthesia Pre-Procedure Evaluation    Patient: Mihaela Cespedes   MRN: [id number] : 1985        Procedure :           Past Medical History:   Diagnosis Date     Diabetes (H)      Hypertension       History reviewed. No pertinent surgical history.   No Known Allergies   Social History     Tobacco Use     Smoking status: Never     Smokeless tobacco: Never   Substance Use Topics     Alcohol use: Not Currently      Wt Readings from Last 1 Encounters:   23 124.3 kg (274 lb)        Anesthesia Evaluation            ROS/MED HX  ENT/Pulmonary:    (-) asthma   Neurologic:  - neg neurologic ROS     Cardiovascular:     (+) hypertension----- (-) PIH   METS/Exercise Tolerance:     Hematologic:     (+) no anticoagulation therapy, no coagulopathy,     Musculoskeletal:       GI/Hepatic:     (+) GERD,     Renal/Genitourinary:       Endo:     (+) gestational diabetes and Insulin,    Psychiatric/Substance Use:       Infectious Disease:       Malignancy:       Other:            Physical Exam    Airway        Mallampati: II   TM distance: > 3 FB   Neck ROM: full     Respiratory Devices and Support         Dental  no notable dental history         Cardiovascular   cardiovascular exam normal          Pulmonary   pulmonary exam normal                OUTSIDE LABS:  CBC:   Lab Results   Component Value Date    WBC 9.8 2023    HGB 12.0 2023    HCT 36.1 2023     2023     BMP:   Lab Results   Component Value Date     (L) 2023    POTASSIUM 3.9 2023    CHLORIDE 102 2023    CO2 21 (L) 2023    BUN 9.8 2023    CR 0.76 2023    GLC 98 2023     (H) 2023     COAGS: No results found for: PTT, INR, FIBR  POC: No results found for: BGM, HCG, HCGS  HEPATIC:   Lab Results   Component Value Date    ALBUMIN 3.3 (L) 2023    PROTTOTAL 7.2 2023    ALT 9 2023    AST 15 2023    ALKPHOS 112 (H) 2023    BILITOTAL 0.2 2023      OTHER:   Lab Results   Component Value Date    SANTOSH 9.4 07/03/2023       Anesthesia Plan    ASA Status:  3      Anesthesia Type: Epidural.              Consents    Anesthesia Plan(s) and associated risks, benefits, and realistic alternatives discussed. Questions answered and patient/representative(s) expressed understanding.    - Discussed:     - Discussed with:  Patient         Postoperative Care            Comments:    Other Comments: Orders to manage the epidural infusion have been entered, and through coordination with the nurse, we will continute to manage and monitor the patient's labor epidural.  We will continuously be available to adjust as needed thruout the entire L&D process.             Mustapha Goel MD

## 2023-07-04 NOTE — PROGRESS NOTES
Hospitalist consult, chart check note:     37 year old female  with history of GDM who presented on 7/3/23 with contractions. OB managing glucose and gestational hypertension during labor.   Hospitalist consulted for postpartum glucose management of GDM.     Delivered female infant on 23 at 05:05 am.     Last HbA1c 5.3% 23     PTA Regimen: Lantus 28 units at Bedtime     Per GDM protocol:  - Check blood glucose: Within 2 hours post-delivery, then before meals and at HS.  - BG check, low resistance sliding scale and prn hypoglycemia protocol in place   - Discontinue blood glucose monitoring if BG is within 70-99 mg/dL for 24 hours post delivery.    - If BG is not with 70-99 mg/dL notify Hospitalist for additional follow-up.  - With history of GDM do not anticipate she will have any insulin needs post delivery and can follow up with PCP/OBGYN post discharge for follow up testing (fasting glucose or HbA1c in 4-8 weeks) as needed.   - Discharge planner completed to discontinue insulin and for follow up recommendations.     Recent Labs   Lab 23  0622 23  0404 23  0240 23  0006 23  2303   * 127* 114* 98 114*       No charge note. Will chart check blood glucoses on 23. Please contact us with acute medical questions or concerns.    Marly Cottrell NP  [facility]  Securely message with the Vocera Web Console (learn more here)  Text page via [employer] Paging/Directory

## 2023-07-04 NOTE — CONSULTS
Hospitalst Consult: GDM    37 year old female  with history of GDM who presented on 7/3/23 with contractions. OB managing glucose and gestational hypertension during labor.   Hospitalist consulted for postpartum glucose management of GDM.    Last HbA1c 5.3% 23    PTA Regimen: Lantus 28 units at Bedtime    Per GDM protocol:  - Check blood glucose: Within 2 hours post-delivery, then before meals and at HS.   - Discontinue blood glucose monitoring if BG is within 70-99 mg/dL for 24 hours post delivery.    - *If BG is not with 70-99 mg/dL notify Hospitalist for additional follow-up.  - With history of GDM do not anticipate she will have any insulin needs post delivery and can follow up with PCP/OBGYN post discharge for follow up testing (fasting glucose or HbA1c in 4-8 weeks) as needed.   - Discharge planner completed to discontinue insulin and for follow up recommendations.    Recent Labs   Lab 23  1756   GLC 85       No charge note. Will chart check 23. Call with questions/concerns for internal medicine related issues.    SIDNEY Aguilar, PA-C  Hospitalist DAVIAN  [facility]  Securely message with the Vocera Web Console (learn more here)  Text page via [employer] Paging/Directory

## 2023-07-04 NOTE — H&P
OB Admission History and Physical    HPI:  Patient is a 37 year old female who presents to Labor and Delivery at 40w0d for labor. She was scheduled for cervical ripening and induction, though presented with spontaneous ctx and labor. Denies LOF.   She transferred her pregnancy care to [facility] at 25 weeks.   She has a history of CHTN, though has not required medications.   She completed NIPS which was low risk. Expecting a girl!   Level II anatomy US was WNL. Growth US showed FGR at 32 weeks, though this resolved by 37 weeks with EFW 20%. She has GDMA2 on insulin.   She has a history of Depression and ADHD without medications. She has sickle cell trait - father declined testing.     Patient Active Problem List   Diagnosis     Encounter for triage in pregnant patient     Labor and delivery, indication for care       Full Code      Prenatal Lab Results:      Latest Reference Range & Units 22 00:00   Hepatitis B Surface Antigen (External) Non-Reactive  Non-Reactive (E)   HIV 1&2 Antibody (External) Non-Reactive  Non-Reactive (E)   Rubella Antibody IgG (External) >=1.00 index 13.50 (E)   LAB RESULT - HIM SCAN  Rpt   Hepatitis C Antibody (External) Non-Reactive  Non-Reactive (E)   Treponema Palldum Antibody (External) Negative  Negative (E)   Urine Culture OB Nurse Interpretation (External Result)  Mixed genital fora isolated (E)   (E): External lab result  Rpt: View report in Results Review for more information        OB History    Para Term  AB Living   2 1 1 0 0 1   SAB IAB Ectopic Multiple Live Births   0 0 0 0 1      # Outcome Date GA Lbr Randolph/2nd Weight Sex Delivery Anes PTL Lv   2 Current            1 Term 22     Vag-Spont   GUSTAVO       History reviewed. No pertinent surgical history.    Past Medical History:   Diagnosis Date     Diabetes (H)      Hypertension        Social History     Socioeconomic History     Marital status:      Spouse name: None     Number of children: None  "    Years of education: None     Highest education level: None   Tobacco Use     Smoking status: Never     Smokeless tobacco: Never   Substance and Sexual Activity     Alcohol use: Not Currently     Drug use: Never     Sexual activity: Yes     Partners: Male       Medications:    Medications Prior to Admission   Medication Sig Dispense Refill Last Dose     aspirin 81 MG EC tablet Take 162 mg by mouth daily   2023     Prenatal Vit-Fe Fumarate-FA (PRENATAL MULTIVITAMIN W/IRON) 27-0.8 MG tablet Take by mouth daily   2023     acetone urine (KETOSTIX) test strip Test urine ketones with first urine of the day 50 strip 1      [DISCONTINUED] insulin glargine (LANTUS PEN) 100 UNIT/ML pen Inject 28 Units Subcutaneous At Bedtime   2023       Allergies:    Patient has no known allergies.    Review of Systems:  A comprehensive review of systems was negative except for those noted in HPI    Physical Examination:  Current Inpatient Vital Signs: Blood pressure 98/55, temperature 97.7  F (36.5  C), temperature source Oral, resp. rate 16, height 1.727 m (5' 8\"), weight 124.3 kg (274 lb), last menstrual period 2022, SpO2 94 %.    General:  NAD, A/Ox3  Lungs:  normal effort, no increased work of breathing  Heart:  regular rate  Abdomen:  gravid, consistent with 40w0d   Estimated Fetal Weight: 3000 g  Extremities:  trace edema, nontender  Skin:  normal    Cervical Exam: /-1    CT bpm, Variability: Moderate (6 - 25 bpm), Accelerations: Present and Decelerations: Absent  CTX: every 6-8   Category 1     Latest Reference Range & Units 23 17:56   Sodium 136 - 145 mmol/L 135 (L)   Potassium 3.4 - 5.3 mmol/L 3.9   Chloride 98 - 107 mmol/L 102   Carbon Dioxide (CO2) 22 - 29 mmol/L 21 (L)   Urea Nitrogen 6.0 - 20.0 mg/dL 9.8   Creatinine 0.51 - 0.95 mg/dL 0.76   GFR Estimate >60 mL/min/1.73m2 >90   Calcium 8.6 - 10.0 mg/dL 9.4   Anion Gap 7 - 15 mmol/L 12   Albumin 3.5 - 5.2 g/dL 3.3 (L)   Protein Total 6.4 - " 8.3 g/dL 7.2   Alkaline Phosphatase 35 - 104 U/L 112 (H)   ALT 0 - 50 U/L 9   AST 0 - 45 U/L 15   Bilirubin Total <=1.2 mg/dL 0.2   Glucose 70 - 99 mg/dL 85   Ketone Quantitative <=0.30 mmol/L <0.18   WBC 4.0 - 11.0 10e3/uL 9.8   Hemoglobin 11.7 - 15.7 g/dL 12.0   Hematocrit 35.0 - 47.0 % 36.1   Platelet Count 150 - 450 10e3/uL 259   RBC Count 3.80 - 5.20 10e6/uL 4.38   MCV 78 - 100 fL 82   MCH 26.5 - 33.0 pg 27.4   MCHC 31.5 - 36.5 g/dL 33.2   RDW 10.0 - 15.0 % 12.9   (L): Data is abnormally low  (H): Data is abnormally high    Assessment/Plan:  , intrauterine pregnancy at 40w0d with CECILE of 2023, by Last Menstrual Period     1. Admit to Labor and Delivery for labor management   a. Pain management per pt request  b. AROM or pitocin for augmentation  c. Anticipate   2. CHTN with superimposed preE: Elevated BP after admission to severe range. Pt denies S/S of preE.   a. Severe range BP treated acutely.  Continue to monitor BP.   b. Labs WNL.   c. Start magnesium for seizure ppx and continue until 24 hours postpartum  3. GDMA2: BG checks per protocol. Insulin per protocol.   4. ADHD, depression: no meds. Continue to monitor.   5. GBS Negative.  Prophylaxis: not indicated  6. Rubella immune  7. Blood type AB POS no need for Rhogam        Delores Espinosa MD  She/Her  [phone]  23 3:14 AM

## 2023-07-04 NOTE — PROGRESS NOTES
LABOR PROGRESS NOTE    S:  Feeling tired,but comfortable now with epidural. Has declined a few BG checks and pitocin thus far.     O:   Vitals:    23 0105 23 0115 23 0120 23 0302   BP: 102/50 118/57 98/55    Resp:       Temp:    97.7  F (36.5  C)   TempSrc:    Oral   SpO2:       Weight:       Height:           CT bpm, Variability: Moderate (6 - 25 bpm), Accelerations: Present and Decelerations: Absent  CTX: every 6-8 minutes  Category 1    SVE:  8/100/-1  AROM with clear fluid    A/P:  37 year old  at 40w0d  1.   IUP: Category 1 FHT  2. Labor: s/p AROM now. Plan to start pitocin for augmentation. Continue to monitor. Will reassess in 30 minutes or sooner prn.  3. PreE with SF: history of CHTN, untreated in pregnancy. Severe range BP on admission. Labs WNL. Treated with labetalol, and started on magnesium for seizure ppx.   4. GDMA2: recommend BG hourly during labor. IV attempted to start insulin though after several attempts was unable to be placed and BG returned to normal at that point. BG checks then refused by pt x2. Most recent again elevated. Plan to give subcutaneous insulin now due to imminent delivery and difficulty with placement of IV. Repeat in 1 hr.   5. Anticipate .     Delores Espinosa MD  She/Her  [phone]  23 3:09 AM

## 2023-07-04 NOTE — PROVIDER NOTIFICATION
07/04/23 0150   Provider Notification   Provider Name/Title Dr. Goel   Method of Notification Phone   Request Evaluate - Remote   Notification Reason Other (Comment)  (have given 4 doses of phenylephrine)     Updated Dr. Goel that patient has received 4 doses of phenylephrine and continues to have symptomatic hypotension. Previous -150s now in 90s-100 systolic.  Plan: 500 ml LR fluid bolus and PRN ephedrine in place of phenylephrine.

## 2023-07-04 NOTE — L&D DELIVERY NOTE
Vaginal Delivery Procedure Note     Delivery provider: Monika Espinosa MD; [facility], O[facility]     Delivery date/time: 2023     Preop Dx:   1. IUP at 40w0d  2. Labor  3. CHTN with superimposed preeclampsia with severe features  4. GDMA2  5. Depression  6. ADHD    Postop Dx:   Same as above     Procedure: Spontaneous Vaginal Delivery     Anesthesia:  Epidural    Delivery QBL (mL): 225 mL     Specimens: cord blood, cord gases      Findings: VFI, KENDAL presentation, 3VC, no nuchal cord, Apgars 9/9, 2nd degree perineal laceration    Labor Course: Mihaela Cespedes is a 37 year old  at 40w0d.  She was scheduled for cervical ripening and induction, though presented with spontaneous ctx and labor. Denies LOF.   She transferred her pregnancy care to [facility] at 25 weeks.   She has a history of CHTN, though has not required medications.   She completed NIPS which was low risk. Expecting a girl!   Level II anatomy US was WNL. Growth US showed FGR at 32 weeks, though this resolved by 37 weeks with EFW 20%. She has GDMA2 on insulin.   She has a history of Depression and ADHD without medications. She has sickle cell trait - father declined testing.     GBS negative.   During labor she developed severe range BP and were treated with labetalol. She was started on magnesium.   Her BG were  in labor. An IV could not be started so she received subcutaneous insulin.   She received an epidural for pain management.   She progressed to 8 cm however stalled there. AROM and pitocin started for augmentation. She then progressed to complete and maternal expulsive efforts started.      Fetal tracing 1 throughout labor and 2 during pushing efforts to delivery     BP dropped after epidural and FHT category 2 - magnesium stopped momentarily and IVF bolus started for management of hypotension.  Fetus then delivered quickly thereafter.        Delivery details:  At bedside. Patient complete and pushing. With excellent maternal expulsive  efforts, the infant's head delivered. Examined for nuchal cord and nuchal cord noted and decision made to deliver through. Shoulders followed without force or delay. Delivered VFI to mother's abdomen. Cord clamped and cut after 120   seconds; 3VC. Cord gases and cord blood collected and sent.  Perineum examined and 2nd degree laceration noted 5 cm and repaired in a multilayer fashion using 3-0 vicryl in a running fashion.  Placenta delivered via gentle traction and fundal massage.  Remainder of exam showed no other vaginal or cervical lacerations.  cc. Apgars 9/9 at 1 and 5 minutes respectively. Vaginal counts correct. Fundus firm at -1 with scant/normal flow after.    Sponge and needle count were correct.      stable with mother admitted to NBN. Mother stable in delivery room.     Delores Espinosa MD  She/Her  [phone]  23 5:31 AM

## 2023-07-04 NOTE — PLAN OF CARE
Report from Kassy Cole Rn and care assumed. Reviewed MAR and pump settings and Pt's mag assessment. Pt stated no headache, epigastric pain, blurry vision, or spots. Questions answered.

## 2023-07-04 NOTE — ANESTHESIA PROCEDURE NOTES
"Epidural catheter Procedure Note    Pre-Procedure   Staff -        Anesthesiologist:  Mustapha Goel MD       Performed By: anesthesiologist       Referred By: OB       Location: OB       Pre-Anesthestic Checklist: patient identified, IV checked, risks and benefits discussed, informed consent, monitors and equipment checked and pre-op evaluation  Timeout:       Correct Patient: Yes        Correct Procedure: Yes        Correct Site: Yes        Correct Position: Yes   Procedure Documentation  Procedure: epidural catheter       Patient Position: sitting       Patient Prep/Sterile Barriers: sterile gloves, mask, patient draped       Skin prep: Chloraprep       Local skin infiltrated with 3 mL of 2% lidocaine.        Insertion Site: L3-4. (midline approach).       Technique: LORT saline        LUPIS at 6 cm.       Needle Type: ToShipHawky needle       Needle Gauge: 17.        Needle Length (Inches): 3.5        Catheter: 19 G.          Catheter threaded easily.         4 cm epidural space.         Threaded 10 cm at skin.         # of attempts: 1 and  # of redirects:     Assessment/Narrative         Paresthesias: No.       Test dose of 3 mL lidocaine 1.5% w/ 1:200,000 epinephrine at 00:31 CDT.         Test dose negative, 3 minutes after injection, for signs of intravascular, subdural, or intrathecal injection.       Insertion/Infusion Method: LORT saline       Aspiration negative for Heme or CSF via Epidural Catheter.    Medication(s) Administered   0.125% Bupivacaine + 2 mcg/mL Fentanyl via CADD (Epidural) - EPIDURAL   10 mL - 7/4/2023 12:34:00 AM    FOR [facility] ([facility] [place]) ONLY:   Pain Team Contact information: please page the Pain Team Via [employer]. Search \"Pain\". During daytime hours, please page the attending first. At night please page the resident first.      "

## 2023-07-04 NOTE — PROVIDER NOTIFICATION
07/04/23 1550   Provider Notification   Provider Name/Title Marly Cottrell   Method of Notification Phone   Notification Reason Other     Rhiannon Cottrell NP was called to clarify BG monitoring order.  She wants patient's BG to be monitored as order for 24 hours.

## 2023-07-05 LAB
ALBUMIN SERPL BCG-MCNC: 2.8 G/DL (ref 3.5–5.2)
ALP SERPL-CCNC: 89 U/L (ref 35–104)
ALT SERPL W P-5'-P-CCNC: 9 U/L (ref 0–50)
ANION GAP SERPL CALCULATED.3IONS-SCNC: 11 MMOL/L (ref 7–15)
AST SERPL W P-5'-P-CCNC: 20 U/L (ref 0–45)
BILIRUB SERPL-MCNC: 0.2 MG/DL
BUN SERPL-MCNC: 6.9 MG/DL (ref 6–20)
CALCIUM SERPL-MCNC: 6.8 MG/DL (ref 8.6–10)
CHLORIDE SERPL-SCNC: 105 MMOL/L (ref 98–107)
CREAT SERPL-MCNC: 0.7 MG/DL (ref 0.51–0.95)
DEPRECATED HCO3 PLAS-SCNC: 21 MMOL/L (ref 22–29)
ERYTHROCYTE [DISTWIDTH] IN BLOOD BY AUTOMATED COUNT: 13.1 % (ref 10–15)
GFR SERPL CREATININE-BSD FRML MDRD: >90 ML/MIN/1.73M2
GLUCOSE BLDC GLUCOMTR-MCNC: 79 MG/DL (ref 70–99)
GLUCOSE BLDC GLUCOMTR-MCNC: 83 MG/DL (ref 70–99)
GLUCOSE SERPL-MCNC: 68 MG/DL (ref 70–99)
HCT VFR BLD AUTO: 33.3 % (ref 35–47)
HGB BLD-MCNC: 11.2 G/DL (ref 11.7–15.7)
MCH RBC QN AUTO: 27.6 PG (ref 26.5–33)
MCHC RBC AUTO-ENTMCNC: 33.6 G/DL (ref 31.5–36.5)
MCV RBC AUTO: 82 FL (ref 78–100)
PLATELET # BLD AUTO: 255 10E3/UL (ref 150–450)
POTASSIUM SERPL-SCNC: 3.9 MMOL/L (ref 3.4–5.3)
PROT SERPL-MCNC: 6.4 G/DL (ref 6.4–8.3)
RBC # BLD AUTO: 4.06 10E6/UL (ref 3.8–5.2)
SARS-COV-2 RNA RESP QL NAA+PROBE: NEGATIVE
SODIUM SERPL-SCNC: 137 MMOL/L (ref 136–145)
T PALLIDUM AB SER QL: NONREACTIVE
WBC # BLD AUTO: 9.5 10E3/UL (ref 4–11)

## 2023-07-05 PROCEDURE — 250N000011 HC RX IP 250 OP 636: Mod: JZ | Performed by: OBSTETRICS & GYNECOLOGY

## 2023-07-05 PROCEDURE — 250N000013 HC RX MED GY IP 250 OP 250 PS 637: Performed by: STUDENT IN AN ORGANIZED HEALTH CARE EDUCATION/TRAINING PROGRAM

## 2023-07-05 PROCEDURE — 87635 SARS-COV-2 COVID-19 AMP PRB: CPT | Performed by: OBSTETRICS & GYNECOLOGY

## 2023-07-05 PROCEDURE — 250N000013 HC RX MED GY IP 250 OP 250 PS 637: Performed by: OBSTETRICS & GYNECOLOGY

## 2023-07-05 PROCEDURE — 258N000003 HC RX IP 258 OP 636: Performed by: OBSTETRICS & GYNECOLOGY

## 2023-07-05 PROCEDURE — 99207 PR NO BILLABLE SERVICE THIS VISIT: CPT | Performed by: NURSE PRACTITIONER

## 2023-07-05 PROCEDURE — 120N000012 HC R&B POSTPARTUM

## 2023-07-05 PROCEDURE — 80053 COMPREHEN METABOLIC PANEL: CPT | Performed by: STUDENT IN AN ORGANIZED HEALTH CARE EDUCATION/TRAINING PROGRAM

## 2023-07-05 PROCEDURE — 36415 COLL VENOUS BLD VENIPUNCTURE: CPT | Performed by: STUDENT IN AN ORGANIZED HEALTH CARE EDUCATION/TRAINING PROGRAM

## 2023-07-05 PROCEDURE — 85027 COMPLETE CBC AUTOMATED: CPT | Performed by: STUDENT IN AN ORGANIZED HEALTH CARE EDUCATION/TRAINING PROGRAM

## 2023-07-05 RX ORDER — GUAIFENESIN 600 MG/1
600 TABLET, EXTENDED RELEASE ORAL 2 TIMES DAILY
Status: DISCONTINUED | OUTPATIENT
Start: 2023-07-05 | End: 2023-07-05

## 2023-07-05 RX ORDER — GUAIFENESIN 600 MG/1
600 TABLET, EXTENDED RELEASE ORAL 2 TIMES DAILY PRN
Status: DISCONTINUED | OUTPATIENT
Start: 2023-07-05 | End: 2023-07-06 | Stop reason: HOSPADM

## 2023-07-05 RX ADMIN — SODIUM CHLORIDE, POTASSIUM CHLORIDE, SODIUM LACTATE AND CALCIUM CHLORIDE: 600; 310; 30; 20 INJECTION, SOLUTION INTRAVENOUS at 00:22

## 2023-07-05 RX ADMIN — NIFEDIPINE 30 MG: 30 TABLET, FILM COATED, EXTENDED RELEASE ORAL at 20:08

## 2023-07-05 RX ADMIN — Medication 1 LOZENGE: at 01:07

## 2023-07-05 RX ADMIN — DOCUSATE SODIUM 100 MG: 100 CAPSULE, LIQUID FILLED ORAL at 09:33

## 2023-07-05 RX ADMIN — ACETAMINOPHEN 650 MG: 325 TABLET, FILM COATED ORAL at 06:20

## 2023-07-05 RX ADMIN — ACETAMINOPHEN 650 MG: 325 TABLET, FILM COATED ORAL at 18:18

## 2023-07-05 RX ADMIN — SODIUM CHLORIDE, POTASSIUM CHLORIDE, SODIUM LACTATE AND CALCIUM CHLORIDE: 600; 310; 30; 20 INJECTION, SOLUTION INTRAVENOUS at 03:18

## 2023-07-05 RX ADMIN — ACETAMINOPHEN 650 MG: 325 TABLET, FILM COATED ORAL at 12:03

## 2023-07-05 RX ADMIN — IBUPROFEN 800 MG: 400 TABLET ORAL at 06:20

## 2023-07-05 RX ADMIN — GUAIFENESIN 600 MG: 600 TABLET, EXTENDED RELEASE ORAL at 01:07

## 2023-07-05 RX ADMIN — Medication 1 SPRAY: at 01:07

## 2023-07-05 RX ADMIN — MAGNESIUM SULFATE IN WATER 2 G/HR: 40 INJECTION, SOLUTION INTRAVENOUS at 03:18

## 2023-07-05 RX ADMIN — IBUPROFEN 800 MG: 400 TABLET ORAL at 00:22

## 2023-07-05 RX ADMIN — IBUPROFEN 800 MG: 400 TABLET ORAL at 18:18

## 2023-07-05 RX ADMIN — ACETAMINOPHEN 650 MG: 325 TABLET, FILM COATED ORAL at 02:21

## 2023-07-05 RX ADMIN — AMOXICILLIN AND CLAVULANATE POTASSIUM 1 TABLET: 875; 125 TABLET ORAL at 20:10

## 2023-07-05 RX ADMIN — AMOXICILLIN AND CLAVULANATE POTASSIUM 1 TABLET: 875; 125 TABLET ORAL at 09:33

## 2023-07-05 RX ADMIN — IBUPROFEN 800 MG: 400 TABLET ORAL at 12:03

## 2023-07-05 ASSESSMENT — ACTIVITIES OF DAILY LIVING (ADL)
ADLS_ACUITY_SCORE: 20

## 2023-07-05 NOTE — PROVIDER NOTIFICATION
07/05/23 0035   Provider Notification   Provider Name/Title Dr. Khan   Method of Notification Phone;Electronic Page   Request Evaluate-Remote   Notification Reason Medication Request;Status Update   On-call provider Dr. Khan paged with medication request. Pt reports nasal congestion that started before admission, pt states she feels like she is having a hard time breathing because of it. Pt is also noted to have a congested cough non-productive. Pts LS are clear bilaterally, oxygen Saturaion 100%. Telephone order obtained for saline nasal drops BID PRN, throat lozenges Q1H PRN, and mucinex Q12H PRN. Will place orders TORB and update pt on plan.

## 2023-07-05 NOTE — PLAN OF CARE
Paged by RN to review pt blood pressures. Pt has been persistently mild range this afternoon with intermittent severe range Bps that have resolved spontaneously. Pt asx, continues IV magnesium x 24hr after delivery.  Started on PO procardia XL 30mg daily, first dose now.  Repeat PIH labs ordered for the AM  RN aware to page if pt persistently severe range or if develops sx.    Dilcia Khan MD  07/04/23 10:53 PM

## 2023-07-05 NOTE — ANESTHESIA POSTPROCEDURE EVALUATION
Patient: Mihaela Cespedes    Procedure: * No procedures listed *       Anesthesia Type:  Epidural    Note:  Disposition: Admission   Postop Pain Control: Uneventful            Sign Out: Well controlled pain   PONV: No   Neuro/Psych: Uneventful            Sign Out: Acceptable/Baseline neuro status   Airway/Respiratory: Uneventful            Sign Out: Acceptable/Baseline resp. status   CV/Hemodynamics: Uneventful            Sign Out: Acceptable CV status; No obvious hypovolemia; No obvious fluid overload   Other NRE: NONE   DID A NON-ROUTINE EVENT OCCUR? No    Event details/Postop Comments:  Pt doing well.  Denies epidural-related complaints.           Last vitals:  Vitals:    07/05/23 0315 07/05/23 0415 07/05/23 0515   BP: 132/81 130/83 (!) 144/88   Pulse: 76 75 82   Resp: 16 16 16   Temp: 36.7  C (98.1  F)     SpO2: 97% 96% 96%       Electronically Signed By: Bipin Nguyen MD  July 5, 2023  5:48 AM

## 2023-07-05 NOTE — PROVIDER NOTIFICATION
07/04/23 2230   Provider Notification   Provider Name/Title Dr. Khan   Method of Notification Phone   Notification Reason Status Update     Dr. Khan was updated with patient's two episodes of have BP > 160/110.  She ordered to start Procardia 30 mg po daily.  She wants to be notified if hypertension algorithm is started per protocol.

## 2023-07-05 NOTE — PLAN OF CARE
Goal Outcome Evaluation:  VSS, breastfeeding and pumping for baby.  Fundus is firm at U/1, scant flow, no clots.  Pain controlled with Tylenol and Ibuprofen.  Independent with cares.  Strict I&O.  Reflexes are normal, no clonus noted.  Pt reports being less congested throughout the day and having less cough.   is at bedside and supportive.  Will continue to monitor and support.

## 2023-07-05 NOTE — PLAN OF CARE
Goal Outcome Evaluation:      Plan of Care Reviewed With: patient, spouse    Overall Patient Progress: improvingOverall Patient Progress: improving     VSS. Fundus firm, midline U/1 with scant flow. Reflexes 2+, no clonus. Trace edema in BLE. I/O monitored, see flow sheets. Denies headache, blurred vision or RUQ pain. Mag running at 2g/hr see MAR. Pain well controlled with tylenol/ibuprofen. Utilizing heat to abdomen for cramping. Up independently in room.  Working on breastfeeding  and  cares. Progressing per care plan. Continue to monitor and notify MD as needed.

## 2023-07-05 NOTE — PROGRESS NOTES
"Mihaela Cespedes  2023    S: pt doing well.  Pain well controlled,  Ambulating without difficulty. Urinating without difficulty and making a large amount of urine each time. Tolerating po intake.  Decreased lochia.  Breast and Bottle feeding. Denies headache, although she has a lot of sinus pressure. Denies change in vision, ruq or epigastric pain. She did not sleep well because she has difficulty breathing due to severe congestion. Oldest child was sick and pt feels she got the cold from her. The symptoms started > 2 weeks ago. She denies fever, chills. She reports sinus pressure, purulent rhinorrhea, tooth pain and severe congestion.     O: /84 (BP Location: Right arm)   Pulse 69   Temp 98.1  F (36.7  C) (Oral)   Resp 16   Ht 1.727 m (5' 8\")   Wt 125.3 kg (276 lb 4.8 oz)   LMP 2022   SpO2 100%   Breastfeeding Unknown   BMI 42.01 kg/m    Recent Labs   Lab 23  1756   HGB 12.0     23 0025  Glucose by meter   Collected: 23 0018  Final result  Specimen: Blood, Capillary from Peripheral Blood    GLUCOSE BY METER POCT 79 mg/dL          Abdomen: soft, non tender, fundus firm below the umbilicus  Ext: non tender, no edema or erythema    A/P: s/p  PPD #1  CHTN with SI pre E with SF  - s/p magnesium (discontinued at 0815 this morning)  - labs pending  - started on procardia 30 mg XL at 2000 on 23  - monitor blood pressures today and increase medications prn.    GDMA2  - blood sugar normal this morning  - okay to discontinue blood sugar checks at this time  - plan postpartum 2 hour testing    Upper respitory infection/sinusitus.  - plan for amoxicillin since >2 weeks  - covid swab added to labs although passed the quarantine phase and family was negative on testing    Dispo:  Continue routine post partum care  Discharge planning for tomorrow pending blood pressures and pre E symptoms    Jania Capone MD    Interval update 23 at 1101  23 0957  Symptomatic " COVID-19 Virus (Coronavirus) by PCR Nasopharyngeal   Collected: 07/05/23 0819  Final result  Specimen: Swab from Nasopharyngeal    SARS CoV2 PCR Negative             07/05/23 0912  Comprehensive metabolic panel   Collected: 07/05/23 0833  Final result  Specimen: Blood from Hand, Right    Sodium 137 mmol/L Glucose 68 Low  mg/dL   Potassium 3.9 mmol/L Alkaline Phosphatase 89 U/L   Chloride 105 mmol/L AST 20 U/L    Carbon Dioxide (CO2) 21 Low  mmol/L ALT 9 U/L    Anion Gap 11 mmol/L Protein Total 6.4 g/dL   Urea Nitrogen 6.9 mg/dL Albumin 2.8 Low  g/dL   Creatinine 0.70 mg/dL Bilirubin Total 0.2 mg/dL   Calcium 6.8 Low  mg/dL GFR Estimate >90 mL/min/1.73m2          07/05/23 0846  CBC with platelets   Collected: 07/05/23 0833  Final result  Specimen: Blood from Hand, Right    WBC Count 9.5 10e3/uL MCH 27.6 pg   RBC Count 4.06 10e6/uL MCHC 33.6 g/dL   Hemoglobin 11.2 Low  g/dL RDW 13.1 %   Hematocrit 33.3 Low  % Platelet Count 255 10e3/uL   MCV 82 fL          BP Readings from Last 6 Encounters:   07/05/23 (!) 140/86     BP: 140/86 Abnormal   as of 7/5/2023 140/86 Abnormal  130/84 144/88 Abnormal      Lab and blood pressures reviewed.  No current changes recommended.    Jania Capone MD

## 2023-07-05 NOTE — PROGRESS NOTES
Hospitalist consult, chart check note:     37 year old female  with history of GDM who presented on 7/3/23 with contractions. Hospitalist consulted for postpartum glucose management of GDM. Delivered female infant on 23 at 05:05 am.     Last HbA1c 5.3% 23     PTA Regimen: Lantus 28 units at Bedtime     Per GDM protocol:  - Check blood glucose: Within 2 hours post-delivery, then before meals and at HS.  - BG check, low resistance sliding scale and prn hypoglycemia protocol in place   - Discontinue blood glucose monitoring if BG is within 70-99 mg/dL for 24 hours post delivery.  -Blood glucose 83 this morning. Appropriate to stop insulin at this time. Can follow up with PCP/OBGYN post-discharge for follow up testing PRN.     Recent Labs   Lab 23  0619 23  0018 23  1845 23  0622 23  0404 23  0240   GLC 83 79 119* 124* 127* 114*       Chart check completed today. Hospitalist team will continue to follow peripherally. Please contact the hospitalist group with any acute medical questions or concerns.     BENJI [facility]  Securely message with the [employer] Web Console (learn more here)  Text page via [employer] Paging/Directory        No charge note.

## 2023-07-05 NOTE — PLAN OF CARE
Goal Outcome Evaluation:      Plan of Care Reviewed With: patient, spouse    Overall Patient Progress: improvingOverall Patient Progress: improving     Vital signs are stable.  Pain well controlled with ibuprofen and tylenol as needed. Up and goes to the bathroom independently.  Good intake and output.  Working on breastfeeding, mostly bottle feeding infant 10-15 ml.  She did start to pump but complaints about having severe cramping.  Her BP went up to 161/91 after an one hour breastfeeding session with lactation consultant.  Her BP was monitored q15 minutes for one hour.  All four of BP were below 160/110. One hour BP monitoring protocol has been resumed after that.  She denies headaches,visual disturbances, and epigastric pain.  Reflex is normal.  No clonus.  She is independent with  cares. On pathway. Continue to monitor and notify MD as needed.

## 2023-07-06 VITALS
OXYGEN SATURATION: 100 % | WEIGHT: 274.69 LBS | HEIGHT: 68 IN | DIASTOLIC BLOOD PRESSURE: 75 MMHG | BODY MASS INDEX: 41.63 KG/M2 | RESPIRATION RATE: 16 BRPM | SYSTOLIC BLOOD PRESSURE: 121 MMHG | HEART RATE: 86 BPM | TEMPERATURE: 98.4 F

## 2023-07-06 PROBLEM — O11.9 CHRONIC HYPERTENSION WITH SUPERIMPOSED PREECLAMPSIA: Status: ACTIVE | Noted: 2023-07-06

## 2023-07-06 PROCEDURE — 250N000013 HC RX MED GY IP 250 OP 250 PS 637: Performed by: OBSTETRICS & GYNECOLOGY

## 2023-07-06 PROCEDURE — 250N000013 HC RX MED GY IP 250 OP 250 PS 637: Performed by: STUDENT IN AN ORGANIZED HEALTH CARE EDUCATION/TRAINING PROGRAM

## 2023-07-06 PROCEDURE — 99207 PR NO BILLABLE SERVICE THIS VISIT: CPT | Performed by: PHYSICIAN ASSISTANT

## 2023-07-06 RX ORDER — NIFEDIPINE 30 MG
30 TABLET, EXTENDED RELEASE ORAL DAILY
Qty: 30 TABLET | Refills: 0 | Status: SHIPPED | OUTPATIENT
Start: 2023-07-06 | End: 2024-06-03

## 2023-07-06 RX ADMIN — ACETAMINOPHEN 650 MG: 325 TABLET, FILM COATED ORAL at 06:09

## 2023-07-06 RX ADMIN — Medication 1 LOZENGE: at 00:24

## 2023-07-06 RX ADMIN — DOCUSATE SODIUM 100 MG: 100 CAPSULE, LIQUID FILLED ORAL at 07:50

## 2023-07-06 RX ADMIN — AMOXICILLIN AND CLAVULANATE POTASSIUM 1 TABLET: 875; 125 TABLET ORAL at 07:50

## 2023-07-06 RX ADMIN — ACETAMINOPHEN 650 MG: 325 TABLET, FILM COATED ORAL at 00:04

## 2023-07-06 RX ADMIN — IBUPROFEN 800 MG: 400 TABLET ORAL at 00:04

## 2023-07-06 RX ADMIN — IBUPROFEN 800 MG: 400 TABLET ORAL at 06:09

## 2023-07-06 ASSESSMENT — ACTIVITIES OF DAILY LIVING (ADL)
ADLS_ACUITY_SCORE: 20

## 2023-07-06 NOTE — PLAN OF CARE
Goal Outcome Evaluation:  Feels well. Vitals stable. Denies symptoms of pre eclampsia. Oral pain medications working well. Breast and bottle feeding- ready for discharge home with baby.

## 2023-07-06 NOTE — PROGRESS NOTES
Hospitalist FOLLOW UP CHART CHECK:      37 year old female  with history of GDM on insulin who presented on 7/3/23 with contractions. Hospitalist consulted for postpartum glucose management of GDM. Delivered female infant on 23 at 05:05 am.     Last HbA1c 5.3% 23     PTA Regimen: Lantus 28 units at Bedtime     Recent Labs   Lab 23  0833 23  0619 23  0018 23  1845 23  0622 23  0404   GLC 68* 83 79 119* 124* 127*     Hypoglycemia protocol in place. OK to discontinue frequent glucose monitoring once hypoglycemia corrected. No indication for insulin at the time of discharge. Follow-up outpatient with PCP/OB for follow-up diabetes testing.     Hospitalist service will sign off. Do not hesitate to page with questions or concerns.     Eugenia Schultz PA-C   [phone]

## 2023-07-06 NOTE — DISCHARGE INSTRUCTIONS
Please call the office if you experience  - bleeding through more than a pad per hour persistently  - passage of blood clots greater than the size of cirilo  - abnormal vaginal discharge  - temperature greater than 100.4  - inability to tolerate food or fluid  - pain not controlled with oral medications  - persistent headache, vision changes, chest pain, shortness of breath, pain in the upper belly  - significant breast pain  - mood changes that affect your quality of life    Please make an appointment at [facility] early next week for a blood pressure check and in 6 weeks for a routine postpartum visit    Preeclampsia   Call your doctor right away if you have any of the following:  - Edema (swelling) in your face or hands  - Rapid weight gain-about 1 pound or more in a day  - Headache  - Abdominal pain on your right side  - Vision problems (flashes or spots)  - You have questions or concerns once you return home.Postpartum Vaginal Delivery Instructions    Activity     Ask family and friends for help when you need it.  Do not place anything in your vagina for 6 weeks.  You are not restricted on other activities, but take it easy for a few weeks to allow your body to recover from delivery.  You are able to do any activities you feel up to that point.  No driving until you have stopped taking your pain medications (usually two weeks after delivery).     Call your health care provider if you have any of these symptoms:     Increased pain, swelling, redness, or fluid around your stiches from an episiotomy or perineal tear.  A fever above 100.4 F (38 C) with or without chills when placing a thermometer under your tongue.  You soak a sanitary pad with blood within 1 hour, or you see blood clots larger than a golf ball.  Bleeding that lasts more than 6 weeks.  Vaginal discharge that smells bad.  Severe pain, cramping or tenderness in your lower belly area.  A need to urinate more frequently (use the toilet more often),  more urgently (use the toilet very quickly), or it burns when you urinate.  Nausea and vomiting.  Redness, swelling or pain around a vein in your leg.  Problems breastfeeding or a red or painful area on your breast.  Chest pain and cough or are gasping for air.  Problems coping with sadness, anxiety, or depression.  If you have any concerns about hurting yourself or the baby, call your provider immediately.   You have questions or concerns after you return home.     Keep your hands clean:  Always wash your hands before touching your perineal area and stitches.  This helps reduce your risk of infection.  If your hands aren't dirty, you may use an alcohol hand-rub to clean your hands. Keep your nails clean and short.

## 2023-07-06 NOTE — DISCHARGE SUMMARY
[facility]    Discharge Summary  Obstetrics    Date of Admission:  7/3/2023  Date of Discharge:  2023  Discharging Provider: Jeniffer Caldwell MD    Discharge Diagnoses   CHTN  Superimposed preeclampsia with severe features  GDM - insulin-controlled  URI  S/p     History of Present Illness   Mihaela Cespedes is a 37 year old  with CHTN and GDM controlled with insulin who presented @ 40+0 in labor and was diagnosed with SIPEC with SF upon admission. Labor was augmented with pitocin and amniotomy. She gave birth to a viable female infant weighing 6#11.6 oz with Apgars of 9/9.     Hospital Course   She received magnesium throughout labor and for 24 hours postpartum due to preeclampsia with severe features. She was discharged taking nifedipine XL 30 mg daily. She was given abx for URI. She recovered as anticipated and experienced no post-delivery complications. On discharge, her pain was well controlled. Vaginal bleeding appropriate.  Voiding without difficulty.  Ambulating well and tolerating a normal diet.  No fevers.  Breastfeeding and formula feeding.  Infant stable.  She was discharged on post-partum day 2 with plan for BP f/u in the office early the following week..    Post-partum hemoglobin:   Hemoglobin   Date Value Ref Range Status   2023 11.2 (L) 11.7 - 15.7 g/dL Final       Jeniffer Caldwell MD    7/10/2023 0940    Discharge Disposition   Discharged to home   Condition at discharge: Stable    Primary Care Physician   Physician No Ref-Primary    Consultations This Hospital Stay   HOSPITALIST IP CONSULT  ANESTHESIOLOGY IP CONSULT  LACTATION IP CONSULT    Discharge Orders      Follow-up and recommended labs and tests     Follow up with primary care provider or OBGYN, within 4-8 weeks, to follow up gestational diabetes. The following labs/tests are recommended: HbA1c or fasting glucose.     Monitor and record    blood glucose only as needed if you feel symptomatic. No  need for regular glucose checks unless you are having issues with high or low blood sugars or are otherwise directed by your provider.     Discharge Instructions    If you are taking insulin or any medications for gestational diabetes you may discontinue them unless otherwise told by your provider.     Activity    Activity as tolerated     Reason for your hospital stay    Maternity care     Follow Up    Follow up at [facility] early next week for BP check and in 6 weeks for routine postpartum visit     Breast pump - Manual/Electric    Breast Pump Documentation:  Manual/Electric Pump: To support adequate breast milk production and nutrition for infant.     I, the undersigned, certify that the above prescribed supplies are medically necessary for this patient and is both reasonable and necessary in reference to accepted standards of medical and necessary in reference to accepted standards of medical practice in the treatment of this patient's condition and is not prescribed as a convenience.     Diet    Resume previous diet     Discharge Medications   Discharge Medication List as of 7/6/2023  9:01 AM      START taking these medications    Details   NIFEdipine ER OSMOTIC (ADALAT CC) 30 MG 24 hr tablet Take 1 tablet (30 mg) by mouth daily, Disp-30 tablet, R-0, E-Prescribe         CONTINUE these medications which have NOT CHANGED    Details   acetone urine (KETOSTIX) test strip Test urine ketones with first urine of the dayDisp-50 strip, [id number]      Prenatal Vit-Fe Fumarate-FA (PRENATAL MULTIVITAMIN W/IRON) 27-0.8 MG tablet Take by mouth daily, Historical         STOP taking these medications       aspirin 81 MG EC tablet Comments:   Reason for Stopping:         insulin glargine (LANTUS PEN) 100 UNIT/ML pen Comments:   Reason for Stopping:             Allergies   No Known Allergies    Jeniffer Caldwell MD

## 2023-07-06 NOTE — PLAN OF CARE
Vital signs stable, -130s/60-80s overnight. Postpartum assessment WDL except for intermittent nonproductive cough, chloraseptic lozenge x1 overnight. Patient denies headache, visual disturbances, or epigastric pain. Reflexes 2+, no clonus. Patient had 15 minute episode with reports of numbness to left upper lip when she woke up in the middle of the night, no facial drooping or left-sided weakness, able to smile symmetrically, completely resolved on its own and no further reports/complaints throughout remainder of night. Pain controlled with tylenol and ibuprofen, ice packs and tucks pads to perineum, lanolin to nipples. Patient ambulating independently, free of dizziness or lightheadedness. Patient voiding without difficulty. Strict intake and output. LS clear and equal bilaterally. PIV SL. Breastfeeding on cue with minimal to no assist and supplementing infant with formula via bottle per patient's preference. Patient and infant bonding well. Will continue with current plan of care.      Goal Outcome Evaluation:      Plan of Care Reviewed With: patient, spouse    Overall Patient Progress: improvingOverall Patient Progress: improving

## 2023-07-06 NOTE — PROGRESS NOTES
"Mihaela Cespedes  2023   PPD2 s/p     S: 37 year old  delivered at 40w0d   Doing well without complaints.  Sore but medication helping.   Lochia moderate.   Ambulating.  Urinating without issues.  Breastfeeding and formula feeding.    O: /75 (BP Location: Left arm, Patient Position: Semi-Lott's, Cuff Size: Adult Large)   Pulse 86   Temp 98.4  F (36.9  C) (Oral)   Resp 16   Ht 1.727 m (5' 8\")   Wt 124.6 kg (274 lb 11.1 oz)   LMP 2022   SpO2 100%   Breastfeeding Unknown   BMI 41.77 kg/m    Gen: NAD  Abd: soft, NT, ND, FF below U  Ext: NT, nonedematous    A/P:  37 year old  PPD2 s/p  after presenting in labor, also with CHTN with [facility] with SF on admission    CHTN with [facility] with SF  - continue nifedipine XL 30 mg daily - normotensive for last 24 hours  - she has a BP cuff at home and will check BPs a couple times daily  - f/u in the office for BP check early next week    GDM  - controlled by insulin during pregnancy  - no need for continued blood sugar checks  - plan 2 hr GCT in 6-12 weeks    URI  - continue augmentin x 2 weeks    Postpartum  - ibuprofen and acetaminophen PRN pain  - continue nifedipine   - support breastfeeding  - monitor lochia  - encourage ambulation  - regular diet  - routine PP care  - stable for discharge to home today    Jeniffer Caldwell MD  Text Page (8am - 5pm)  2023 0849    "

## 2024-01-31 ENCOUNTER — OFFICE VISIT (OUTPATIENT)
Dept: FAMILY MEDICINE | Facility: CLINIC | Age: 39
End: 2024-01-31
Payer: COMMERCIAL

## 2024-01-31 VITALS
SYSTOLIC BLOOD PRESSURE: 126 MMHG | HEART RATE: 83 BPM | RESPIRATION RATE: 20 BRPM | BODY MASS INDEX: 38.81 KG/M2 | WEIGHT: 256.1 LBS | HEIGHT: 68 IN | OXYGEN SATURATION: 99 % | DIASTOLIC BLOOD PRESSURE: 80 MMHG | TEMPERATURE: 97.3 F

## 2024-01-31 DIAGNOSIS — R10.31 CHRONIC PAIN OF RIGHT INGUINAL REGION: ICD-10-CM

## 2024-01-31 DIAGNOSIS — E66.812 CLASS 2 OBESITY WITHOUT SERIOUS COMORBIDITY WITH BODY MASS INDEX (BMI) OF 39.0 TO 39.9 IN ADULT, UNSPECIFIED OBESITY TYPE: ICD-10-CM

## 2024-01-31 DIAGNOSIS — Z13.220 LIPID SCREENING: ICD-10-CM

## 2024-01-31 DIAGNOSIS — F41.9 ANXIETY: ICD-10-CM

## 2024-01-31 DIAGNOSIS — H61.23 BILATERAL IMPACTED CERUMEN: ICD-10-CM

## 2024-01-31 DIAGNOSIS — G89.29 CHRONIC RIGHT-SIDED LOW BACK PAIN WITHOUT SCIATICA: ICD-10-CM

## 2024-01-31 DIAGNOSIS — Z13.1 SCREENING FOR DIABETES MELLITUS: ICD-10-CM

## 2024-01-31 DIAGNOSIS — M54.50 CHRONIC RIGHT-SIDED LOW BACK PAIN WITHOUT SCIATICA: ICD-10-CM

## 2024-01-31 DIAGNOSIS — G89.29 CHRONIC PAIN OF RIGHT INGUINAL REGION: ICD-10-CM

## 2024-01-31 DIAGNOSIS — R03.0 ELEVATED BLOOD PRESSURE READING: ICD-10-CM

## 2024-01-31 DIAGNOSIS — Z00.00 ROUTINE HISTORY AND PHYSICAL EXAMINATION OF ADULT: Primary | ICD-10-CM

## 2024-01-31 LAB
ERYTHROCYTE [DISTWIDTH] IN BLOOD BY AUTOMATED COUNT: 12.9 % (ref 10–15)
HBA1C MFR BLD: 5.6 % (ref 0–5.6)
HCT VFR BLD AUTO: 41.4 % (ref 35–47)
HGB BLD-MCNC: 13.7 G/DL (ref 11.7–15.7)
MCH RBC QN AUTO: 27.3 PG (ref 26.5–33)
MCHC RBC AUTO-ENTMCNC: 33.1 G/DL (ref 31.5–36.5)
MCV RBC AUTO: 83 FL (ref 78–100)
PLATELET # BLD AUTO: 294 10E3/UL (ref 150–450)
RBC # BLD AUTO: 5.01 10E6/UL (ref 3.8–5.2)
WBC # BLD AUTO: 5.8 10E3/UL (ref 4–11)

## 2024-01-31 PROCEDURE — 99214 OFFICE O/P EST MOD 30 MIN: CPT | Mod: 25 | Performed by: INTERNAL MEDICINE

## 2024-01-31 PROCEDURE — 90471 IMMUNIZATION ADMIN: CPT | Performed by: INTERNAL MEDICINE

## 2024-01-31 PROCEDURE — 69209 REMOVE IMPACTED EAR WAX UNI: CPT | Mod: 50 | Performed by: INTERNAL MEDICINE

## 2024-01-31 PROCEDURE — 85027 COMPLETE CBC AUTOMATED: CPT | Performed by: INTERNAL MEDICINE

## 2024-01-31 PROCEDURE — 90480 ADMN SARSCOV2 VAC 1/ONLY CMP: CPT | Performed by: INTERNAL MEDICINE

## 2024-01-31 PROCEDURE — 99385 PREV VISIT NEW AGE 18-39: CPT | Mod: 25 | Performed by: INTERNAL MEDICINE

## 2024-01-31 PROCEDURE — 90686 IIV4 VACC NO PRSV 0.5 ML IM: CPT | Performed by: INTERNAL MEDICINE

## 2024-01-31 PROCEDURE — 80061 LIPID PANEL: CPT | Performed by: INTERNAL MEDICINE

## 2024-01-31 PROCEDURE — 80053 COMPREHEN METABOLIC PANEL: CPT | Performed by: INTERNAL MEDICINE

## 2024-01-31 PROCEDURE — 84443 ASSAY THYROID STIM HORMONE: CPT | Performed by: INTERNAL MEDICINE

## 2024-01-31 PROCEDURE — 83036 HEMOGLOBIN GLYCOSYLATED A1C: CPT | Performed by: INTERNAL MEDICINE

## 2024-01-31 PROCEDURE — 36415 COLL VENOUS BLD VENIPUNCTURE: CPT | Performed by: INTERNAL MEDICINE

## 2024-01-31 PROCEDURE — 91320 SARSCV2 VAC 30MCG TRS-SUC IM: CPT | Performed by: INTERNAL MEDICINE

## 2024-01-31 SDOH — HEALTH STABILITY: PHYSICAL HEALTH: ON AVERAGE, HOW MANY DAYS PER WEEK DO YOU ENGAGE IN MODERATE TO STRENUOUS EXERCISE (LIKE A BRISK WALK)?: 0 DAYS

## 2024-01-31 ASSESSMENT — SOCIAL DETERMINANTS OF HEALTH (SDOH): HOW OFTEN DO YOU GET TOGETHER WITH FRIENDS OR RELATIVES?: PATIENT DECLINED

## 2024-01-31 ASSESSMENT — PAIN SCALES - GENERAL: PAINLEVEL: EXTREME PAIN (8)

## 2024-01-31 NOTE — PROGRESS NOTES
Preventive Care Visit  [facility]  Gaye Baez MD, Internal Medicine  Jan 31, 2024    Assessment & Plan   Problem List Items Addressed This Visit    None  Visit Diagnoses       Routine history and physical examination of adult    -  Primary    Elevated blood pressure reading        h/o precalpmsia with last pregnancy    Relevant Orders    24 Hour Blood Pressure Monitor - Adult    CBC with platelets (Completed)    Comprehensive metabolic panel (BMP + Alb, Alk Phos, ALT, AST, Total. Bili, TP) (Completed)    Lipid screening        Relevant Orders    Lipid panel reflex to direct LDL Fasting (Completed)    Screening for diabetes mellitus        Relevant Orders    Hemoglobin A1c (Completed)    Anxiety        Relevant Orders    TSH with free T4 reflex (Completed)    Chronic right-sided low back pain without sciatica        Relevant Orders    Physical Therapy Referral    Chronic pain of right inguinal region        Relevant Orders    Physical Therapy Referral    Class 2 obesity without serious comorbidity with body mass index (BMI) of 39.0 to 39.9 in adult, unspecified obesity type        Relevant Orders    Adult Comprehensive Weight Management  Referral    Bilateral impacted cerumen        Relevant Orders    REMOVE IMPACTED CERUMEN (Completed)           Discussed multiple health concerns.  Chronic right groin anterior thigh pain happens with few steps of walking ongoing for last couple months.  Also chronic right lower back pain radiates to the right buttocks area.  Referral to PT may consider referral to sports medicine as well.  History of anxiety we will check some thyroid test, consider counseling.  24-hour blood pressure monitor history of preeclampsia with pressure elevated, further recommendation pending blood pressure reading.  Continue with healthy lifestyle changes exercise activities and weight loss as tolerated low-salt diet.  Consideration for GLP-1 agonist injection, referral  "to weight management program, patient in agreement with plan.         BMI  Estimated body mass index is 38.94 kg/m  as calculated from the following:    Height as of this encounter: 1.727 m (5' 8\").    Weight as of this encounter: 116.2 kg (256 lb 1.6 oz).   Weight management plan: Discussed healthy diet and exercise guidelines    Counseling  Appropriate preventive services were discussed with this patient, including applicable screening as appropriate for fall prevention, nutrition, physical activity, Tobacco-use cessation, weight loss and cognition.  Checklist reviewing preventive services available has been given to the patient.  Reviewed patient's diet, addressing concerns and/or questions.     Patient has been advised of split billing requirements and indicates understanding: Yes  See Patient Instructions      Myke Chairez is a 38 year old, presenting for the following:  Physical (Patient is here for annual preventative physical. ) and Establish Care (Patient is here to establish care with primary care provider.)         No data to display                 Health Care Directive  Patient does not have a Health Care Directive or Living Will: Discussed advance care planning with patient; information given to patient to review.  HPI        1/31/2024   General Health   How would you rate your overall physical health? Good   Feel stress (tense, anxious, or unable to sleep) Rather much   (!) STRESS CONCERN      1/31/2024   Nutrition   Three or more servings of calcium each day? (!) I DON'T KNOW   Diet: Regular (no restrictions)   How many servings of fruit and vegetables per day? (!) I DON'T KNOW   How many sweetened beverages each day? 0-1         1/31/2024   Exercise   Days per week of moderate/strenous exercise 0 days   (!) EXERCISE CONCERN      1/31/2024   Social Factors   Frequency of gathering with friends or relatives Patient declined   Worry food won't last until get money to buy more No   Food not last " or not have enough money for food? No   Do you have housing?  Yes   Are you worried about losing your housing? No   Lack of transportation? No   Unable to get utilities (heat,electricity)? No         1/31/2024   Dental   Dentist two times every year? (!) DECLINE         1/31/2024   TB Screening   Were you born outside of [place]?  (!) YES           Today's PHQ-2 Score:       1/31/2024     1:22 PM   PHQ-2 ( 1999 [employer])   Q1: Little interest or pleasure in doing things 0   Q2: Feeling down, depressed or hopeless 1   PHQ-2 Score 1   Q1: Little interest or pleasure in doing things Not at all   Q2: Feeling down, depressed or hopeless Several days   PHQ-2 Score 1           1/31/2024   Substance Use   Alcohol more than 3/day or more than 7/wk No   Do you use any other substances recreationally? No     Social History     Tobacco Use    Smoking status: Never    Smokeless tobacco: Never   Substance Use Topics    Alcohol use: Not Currently    Drug use: Never          Patient under 40 years of age: Routine Mammogram Screening not recommended.     1/31/2024   STI Screening   New sexual partner(s) since last STI/HIV test? No     History of abnormal Pap smear: Last 3 Pap and HPV Results:       11/5/2021     4:05 PM   PAP / HPV   PAP-ABSTRACT See Scanned Document           This result is from an external source.           11/5/2021     4:05 PM   PAP / HPV   PAP-ABSTRACT See Scanned Document           This result is from an external source.           1/31/2024   Contraception/Family Planning   Questions about contraception or family planning (!) YES         Reviewed and updated as needed this visit by Provider      Problems  Med Hx  Surg Hx  Fam Hx            Past Medical History:   Diagnosis Date    ADHD (attention deficit hyperactivity disorder)     Anxiety     Depression     Diabetes (H)     Hypertension     Preeclampsia     Sleep disorder      Past Surgical History:   Procedure Laterality Date    SURGICAL PATHOLOGY EXAM       right foot     OB History    Para Term  AB Living   2 2 2 0 0 2   SAB IAB Ectopic Multiple Live Births   0 0 0 0 2      # Outcome Date GA Lbr [place]/2nd Weight Sex Delivery Anes PTL Lv   2 Term 23 40w0d / 00:14 3.05 kg (6 lb 11.6 oz) F Vag-Spont EPI N GUSTAVO      Name: KIRTIFEMALE-KIRSTEN      Apgar1: 9  Apgar5: 9   1 Term 22     Vag-Spont   GUSTAVO     Lab work is in process  Labs reviewed in EPIC  BP Readings from Last 3 Encounters:   24 126/80   23 121/75    Wt Readings from Last 3 Encounters:   24 116.2 kg (256 lb 1.6 oz)   23 124.6 kg (274 lb 11.1 oz)                  Patient Active Problem List   Diagnosis    Encounter for triage in pregnant patient    Labor and delivery, indication for care    Chronic hypertension with superimposed preeclampsia     Past Surgical History:   Procedure Laterality Date    SURGICAL PATHOLOGY EXAM      right foot       Social History     Tobacco Use    Smoking status: Never    Smokeless tobacco: Never   Substance Use Topics    Alcohol use: Not Currently     Family History   Problem Relation Age of Onset    Myocardial Infarction Mother         at 62    Coronary Artery Disease Father         in his 50's    Diabetes Other         mat aunt         Current Outpatient Medications   Medication Sig Dispense Refill    acetone urine (KETOSTIX) test strip Test urine ketones with first urine of the day (Patient not taking: Reported on 2024) 50 strip 1    amoxicillin-clavulanate (AUGMENTIN) 875-125 MG tablet Take 1 tablet by mouth every 12 hours (Patient not taking: Reported on 2024) 12 tablet 0    NIFEdipine ER OSMOTIC (ADALAT CC) 30 MG 24 hr tablet Take 1 tablet (30 mg) by mouth daily (Patient not taking: Reported on 2024) 30 tablet 0    Prenatal Vit-Fe Fumarate-FA (PRENATAL MULTIVITAMIN W/IRON) 27-0.8 MG tablet Take by mouth daily (Patient not taking: Reported on 2024)       No Known Allergies  Recent Labs   Lab Test 24  9187  "07/05/23  0833 07/03/23  1756   A1C 5.6  --   --    *  --   --    HDL 41*  --   --    TRIG 121  --   --    ALT 11 9 9   CR 0.92 0.70 0.76   GFRESTIMATED 82 >90 >90   POTASSIUM 3.9 3.9 3.9   TSH 0.55  --   --       Review of Systems    Review of Systems  Constitutional, HEENT, cardiovascular, pulmonary, GI, , musculoskeletal, neuro, skin, endocrine and psych systems are negative, except as otherwise noted.     Objective    Exam  /80 (BP Location: Left arm, Patient Position: Sitting, Cuff Size: Adult Large)   Pulse 83   Temp 97.3  F (36.3  C) (Temporal)   Resp 20   Ht 1.727 m (5' 8\")   Wt 116.2 kg (256 lb 1.6 oz)   LMP 01/10/2024 (Approximate)   SpO2 99%   BMI 38.94 kg/m     Estimated body mass index is 38.94 kg/m  as calculated from the following:    Height as of this encounter: 1.727 m (5' 8\").    Weight as of this encounter: 116.2 kg (256 lb 1.6 oz).  Physical Exam  GENERAL: alert and no distress  EYES: Eyes grossly normal to inspection, PERRL and conjunctivae and sclerae normal  HENT: ear canals and TM's normal, nose and mouth without ulcers or lesions  NECK: no adenopathy, no asymmetry, masses, or scars  RESP: lungs clear to auscultation - no rales, rhonchi or wheezes  CV: regular rate and rhythm, normal S1 S2, no S3 or S4, no murmur, click or rub, no peripheral edema  ABDOMEN: soft, nontender, no hepatosplenomegaly, no masses and bowel sounds normal  MS: no gross musculoskeletal defects noted, no edema  SKIN: no suspicious lesions or rashes  NEURO: Normal strength and tone, mentation intact and speech normal  PSYCH: mentation appears normal, affect normal/bright  LYMPH: no cervical, supraclavicular, axillary, or inguinal adenopathy      Signed Electronically by: Gaye Baez MD    "

## 2024-01-31 NOTE — NURSING NOTE
Patient identified using two patient identifiers.  Ear exam showing wax occlusion completed by provider.  Solution: warm water was placed in the bilateral ear(s) via irrigation tool: elephant ear.        Terrence Gimenez MA on 1/31/2024 at 4:03 PM

## 2024-02-01 LAB
ALBUMIN SERPL BCG-MCNC: 4.2 G/DL (ref 3.5–5.2)
ALP SERPL-CCNC: 65 U/L (ref 40–150)
ALT SERPL W P-5'-P-CCNC: 11 U/L (ref 0–50)
ANION GAP SERPL CALCULATED.3IONS-SCNC: 9 MMOL/L (ref 7–15)
AST SERPL W P-5'-P-CCNC: 17 U/L (ref 0–45)
BILIRUB SERPL-MCNC: 0.6 MG/DL
BUN SERPL-MCNC: 8.9 MG/DL (ref 6–20)
CALCIUM SERPL-MCNC: 9.3 MG/DL (ref 8.6–10)
CHLORIDE SERPL-SCNC: 102 MMOL/L (ref 98–107)
CHOLEST SERPL-MCNC: 189 MG/DL
CREAT SERPL-MCNC: 0.92 MG/DL (ref 0.51–0.95)
DEPRECATED HCO3 PLAS-SCNC: 25 MMOL/L (ref 22–29)
EGFRCR SERPLBLD CKD-EPI 2021: 82 ML/MIN/1.73M2
FASTING STATUS PATIENT QL REPORTED: NO
GLUCOSE SERPL-MCNC: 82 MG/DL (ref 70–99)
HDLC SERPL-MCNC: 41 MG/DL
LDLC SERPL CALC-MCNC: 124 MG/DL
NONHDLC SERPL-MCNC: 148 MG/DL
POTASSIUM SERPL-SCNC: 3.9 MMOL/L (ref 3.4–5.3)
PROT SERPL-MCNC: 7.8 G/DL (ref 6.4–8.3)
SODIUM SERPL-SCNC: 136 MMOL/L (ref 135–145)
TRIGL SERPL-MCNC: 121 MG/DL
TSH SERPL DL<=0.005 MIU/L-ACNC: 0.55 UIU/ML (ref 0.3–4.2)

## 2024-02-05 ENCOUNTER — THERAPY VISIT (OUTPATIENT)
Dept: PHYSICAL THERAPY | Facility: CLINIC | Age: 39
End: 2024-02-05
Attending: INTERNAL MEDICINE
Payer: COMMERCIAL

## 2024-02-05 DIAGNOSIS — R10.31 CHRONIC PAIN OF RIGHT INGUINAL REGION: ICD-10-CM

## 2024-02-05 DIAGNOSIS — G89.29 CHRONIC PAIN OF RIGHT INGUINAL REGION: ICD-10-CM

## 2024-02-05 DIAGNOSIS — M54.50 CHRONIC RIGHT-SIDED LOW BACK PAIN WITHOUT SCIATICA: ICD-10-CM

## 2024-02-05 DIAGNOSIS — G89.29 CHRONIC RIGHT-SIDED LOW BACK PAIN WITHOUT SCIATICA: ICD-10-CM

## 2024-02-05 PROCEDURE — 97161 PT EVAL LOW COMPLEX 20 MIN: CPT | Mod: GP

## 2024-02-05 PROCEDURE — 97530 THERAPEUTIC ACTIVITIES: CPT | Mod: GP

## 2024-02-05 PROCEDURE — 97112 NEUROMUSCULAR REEDUCATION: CPT | Mod: GP

## 2024-02-05 NOTE — PROGRESS NOTES
PHYSICAL THERAPY EVALUATION  Type of Visit: Evaluation    See electronic medical record for Abuse and Falls Screening details.    Subjective       Presenting condition or subjective complaint: lower R side back pain  Patient reports to outpatient physical therapy with R LBP pain and R groin pain. She describes pain as more irritable at the end of the day in the back (achy and more severe). She holds her kids mostly on the L side and notices more pain with that vs holding them on the R side. She can maneuver a certain way to bring on the pain, but cannot find it by palpating it. If she side bends toward the R or bends forward to the L she feels it more. The R groin is most prominent with the first few steps getting out of a chair. Denies numbness or tingling into legs. She feels in her R groin there is a snapping sensation - like something is not in the right spot but as she gets walking it gets better. She first starting noticing this when she was pregnant (gave birth July 4th) - starting noticing it in third trimester. The back pain started in 2022 stayed the same level since. Denies pain that wakes her up out of bed at night.    Goals: reduce R LBP by the end of the day, reduce R groin pain with sit to stand into walking    Date of onset: 01/31/24 (date of order)    Relevant medical history: Depression   Past Medical History:   Diagnosis Date    ADHD (attention deficit hyperactivity disorder)     Anxiety     Depression     Diabetes (H)     Hypertension     Preeclampsia     Sleep disorder      Dates & types of surgery:    Past Surgical History:   Procedure Laterality Date    SURGICAL PATHOLOGY EXAM      right foot     Prior diagnostic imaging/testing results:       Prior therapy history for the same diagnosis, illness or injury: No      Prior Level of Function  Transfers: Independent  Ambulation: Independent  ADL: Independent  IADL: Driving, Finances, Housekeeping, Laundry, Meal preparation, Medication management,  Work, Yard work    Living Environment  Social support:     Type of home:     Stairs to enter the home:         Ramp:     Stairs inside the home:         Help at home:    Equipment owned:       Employment:      Hobbies/Interests:      Patient goals for therapy:      Pain assessment: Pain present     Objective   LUMBAR SPINE EVALUATION  PAIN: Pain Level at Rest: 2/10  Pain Level with Use: 7/10  POSTURE: Standing Posture: Lordosis increased  GAIT:   Assistive Device(s): None  Gait Deviations:  valgus bilaterally at knees, increased lumbar lordosis  BALANCE/PROPRIOCEPTION:  not assessed    ROM:  lumbar: flexion = WNL, extension = slight increase pain R side (WFL), side bend = WFL L side, + with R side bend; hip ROM: WNL bilaterally no pain with flexion, IR and ER  STRENGTH:  hip flexion: 4+ R side, 5- L side, hip abd: 4- bilaterally, hip ext: 4+ bilaterally no pain  DERMATOMES:  denies numbness and tingling  NEURAL TENSION:  slump: neg bilaterally; feels increased stretch R>L in R side LB  FLEXIBILITY:  heel to glute: WFL bilaterally  LUMBAR/HIP Special Tests:  not assessed    FUNCTIONAL TESTS: Double Leg Squat: Knee valgus and Improper use of glutes/hips  PALPATION:  no palpable tenderness bilaterally paraspinals or QL R side, no tenderness iliacus bilaterally or rectus femoris bilaterally    Assessment & Plan   CLINICAL IMPRESSIONS  Medical Diagnosis: R LB and R inguinal pain    Treatment Diagnosis:     Impression/Assessment: Patient is a 38 year old female with R LB and R groin/inguinal complaints.  The following significant findings have been identified: Pain, Decreased ROM/flexibility, Decreased strength, Impaired gait, Impaired muscle performance, Decreased activity tolerance, and Impaired posture. These impairments interfere with their ability to perform self care tasks, work tasks, recreational activities, household chores, household mobility, and community mobility as compared to previous level of function.      Clinical Decision Making (Complexity):  Clinical Presentation: Stable/Uncomplicated  Clinical Presentation Rationale: based on medical and personal factors listed in PT evaluation  Clinical Decision Making (Complexity): Low complexity    PLAN OF CARE  Treatment Interventions:  Modalities: Cryotherapy, E-stim, Hot Pack  Interventions: Gait Training, Manual Therapy, Neuromuscular Re-education, Therapeutic Activity, Therapeutic Exercise, Self-Care/Home Management    Long Term Goals     PT Goal 1  Goal Identifier: sit to stand  Goal Description: patient will be able to transfer from sit to stand with no pain in R hip with first few steps  Rationale: to maximize safety and independence with performance of ADLs and functional tasks;to maximize safety and independence within the community;to maximize safety and independence within the home;to maximize safety and independence with self cares  Target Date: 04/29/24  PT Goal 2  Goal Identifier: pain  Goal Description: patient will report 2/10 pain or less in R LB at end of day  Rationale: to maximize safety and independence with performance of ADLs and functional tasks;to maximize safety and independence within the home;to maximize safety and independence with self cares ()  Target Date: 04/29/24      Frequency of Treatment: 1x/wk progressing to 1 x every other week  Duration of Treatment: 2-3 months    Recommended Referrals to Other Professionals: n/a  Education Assessment:   Learner/Method: Patient    Risks and benefits of evaluation/treatment have been explained.   Patient/Family/caregiver agrees with Plan of Care.     Evaluation Time:     PT Eval, Low Complexity Minutes (45542): 13     Signing Clinician: Kenya Byers, PT

## 2024-05-06 NOTE — PROGRESS NOTES
Patient seen for one time evaluation and treatment.  Patient did not return for further treatment and current status is unknown.  Please see initial evaluation for further information.     S:  Patience Garcia presents for f/u of constipation.  She was recently hospitalized with severe constipation and this resolved with medical management.  She has had chronic constipation for most of her life and this has worsened after her recent hip fracture which has caused some immobility.  She does have an InterStim device in place as well because she had issues previously with fecal incontinence in combination with her constipation.  Today she reports she is feeling a little bit bloated.  She is doing her regimen of MiraLax and magnesium citrate.  She has not followed up with GI since her hospitalization.    O:  AF  Vitals:    09/08/20 1136   BP: 112/67   Pulse: 70     Abdomen soft mildly distended nontender     A/P:  Chronic constipation.  I do think this may have been worsened by her recent hip fracture and decreased mobility.  I encouraged her to continue her current regimen and follow-up with Gastroenterology.  We will obtain an x-ray today to determine stool burden.          Jennifer Contreras M.D.

## 2024-05-29 NOTE — PROGRESS NOTES
MEDICAL WEIGHT LOSS INITIAL EVALUATION  Patient accompanied by:self  DIAGNOSIS:  Class II Obesity    NUTRITION HISTORY:    Diet Recall      Wake Up: 7am   Breakfast no   Lunch Chicken Sandwhich - 12:30pm, pineapple juice     Supper Mashed potatoes, hamburger, green beans - around 6:30pm and can be later w/kid schedules    Bedtime:   Snack between meals:   Snack in eveninpm,   Some times during day -mandarin oranges or chips or crackers          Typical snacks:     Caloric beverages per day. What type:     Water consumed daily: ~65-80 oz    Low shanon/diet drinks:   Alcohol:   Foods you crave:               Beverage choices: water (see above),  a few times per week 6 oz fruit juice  Dining out: 3X per week-salad with breaded chicken or cheese burger  Emotional eating: yes, but not recently  Night time eating: no  Eating Disorder Screen     I binge eat no      I make myself vomit what I have eaten or use laxatives to get rid of food. no   I eat a large amount of food, like a loaf of bread, a box of cookies, a pint/quart of ice cream, all at once. no   I eat a large amount of food even when I am not hungry. no   I eat alone because I feel embarrassed and do not want others to see how much I have eaten. no   I eat until I am uncomfortably full. Sometimes    I feel bad, disgusted, or guilty after I overeat. No          Activity/Exercise History           How many times a week are you active for the purpose of exercise? For how long? none   What do you do for exercise?  Enjoys walking while listening to music, does have a gym in apartment building - needs more accountability - will ask next time!   What keeps you from being more active?               EXERCISE:  Type: none      ADDITIONAL INFORMATION: Patient shops for food and her and spouse share the cooking for family (2 kids ages 1 and 2). Patient has not tried to lose weight recently. May wants to have another child and would like to avoid having GDM. Works  "hybrid.      ANTHROPOMETRICS:  Height: 68\"   Weight: 244 lb  patient reported   BMI:  37.10 kg/m2  NUTRITION DIAGNOSIS:  Obese class II related to overeating and poor lifestyle habits as evidence by patient's subjective statements and  BMI of 37.10 kg/m2       NUTRITION INTERVENTIONS  Nutrition Prescription:  Recommend modified energy- nutrient intake  Implementation:  Nutrition Education (Content):  Discussed plates guidelines   Determined ideas for breakfast  Reviewed healthy snacking and beverage choices  Provided: Smart Tips to Power Up with Breakfast, Plate Guidelines, Protein Sources for Weight Loss,     Nutrition Education (Application):   Patient to practice goals as stated below  Patient verbalizes understanding of diet by indicating she cn give up fruit juice  Anticipate good compliance    Goals:  Schedule walking 3X per week for 15 minutes  Eat breakfast daily or have a protein drink  Criteria for selecting a protein drink/8-12 ounces:  < 250 calories  15-30 grams protein  < 10 grams total fat  < 10 grams sugar   Replace fruit juice with whole fruit        FOLLOW UP AND MONITORING:    Other  - follow up in 46 weeks.     TIME SPENT WITH PATIENT:    27 minutes   Bert Knox RD, HAYLIE  [facility], [place]   "

## 2024-05-29 NOTE — PROGRESS NOTES
"New Medical Weight Management Consult    PATIENT:  Mihaela Cespedes   MRN:         [id number]   :         1985  MELISSA:         6/3/2024      Dear Gaye Baez MD,    I had the pleasure of seeing your patient, Mihaela Cespedes. Full intake/assessment was done to determine barriers to weight loss success and develop a treatment plan. Mihaela Cespedes is a 38 year old female interested in treatment of medical problems associated with excess weight. She has a height of 5' 8\", a weight of 244 lbs 0 oz, and the calculated Body mass index is 37.1 kg/m .    Assessment & Plan   Problem List Items Addressed This Visit       Chronic hypertension    Relevant Medications    Semaglutide-Weight Management (WEGOVY) 0.25 MG/0.5ML pen    Semaglutide-Weight Management (WEGOVY) 0.5 MG/0.5ML pen    Semaglutide-Weight Management (WEGOVY) 1 MG/0.5ML pen    Class 2 severe obesity due to excess calories with serious comorbidity in adult (H) - Primary     Initial MWM. Referred to dietitian. Ordered vitamin D for energy level screening. Referred for therapist and dietitian. Plan to try for Wegovy. Discussed mechanism of action, common side effects, titration guidelines, and  discussed risks for pancreatitis/gallbladder problems/gastroparesis/low sugars/MTC/MEN2. Medication handout given in AVS. Did discuss not FDA approved FV compounded semaglutide as back up option.         Relevant Medications    amphetamine-dextroamphetamine (ADDERALL) 10 MG tablet    Semaglutide-Weight Management (WEGOVY) 0.25 MG/0.5ML pen    Semaglutide-Weight Management (WEGOVY) 0.5 MG/0.5ML pen    Semaglutide-Weight Management (WEGOVY) 1 MG/0.5ML pen          SURGICAL WEIGHT LOSS   Option presented given pt BMI and current comorbid conditions. No current interest.       MEDICATIONS:  We discussed healthy habits to assist with weight loss. We reviewed medications associated with weight gain. We discussed the role of pharmacological agents in the treatment of obesity " "and the \"off-label\" use of medications in this practice. We reviewed medication that may assist with weight loss. Indications, contraindications, risks/benefits, and potential side effects were discussed.   Wegovy was prescribed. Discussed that medications must always be used together with lifestyle changes.. Reviewed rationale for long term use of pharmacotherapy in chronic disease management for obesity.      AOM Considerations:  Phentermine:  Caution blood pressure unable to use with Adderall, monitor blood pressure with nifedipine  Topiramate: Oral birth control.  Verify if taking a baby aspirin   GLP-1: Oral birth control.  wegovy/saxenda/zepbound do not appear covered      Naltrexone:    Wellbutrin: Caution blood pressure prescribed bupropion 2/7/2024?   Metformin:    Contrave:  Qsymia:      HTN:    Yes - fluctuates   Anxiety:   Yes, not on medications - avoid anxiety increasing meds   Depression:  Yes - was on previously during pregnancy - no bothersome side effects   Memory Impairment:  Not now - some w/stress/ADHD  Current birth control:  Oral birth control     Discussed medications not indicated w/pregnancy, trying for preg, or nursning and to inform prior to trying. Denies any current concerns              PATIENT INSTRUCTIONS:  Pt Instructions:  Referred for a therapist - they should call you to schedule.  Labs ordered.  Please call [phone] to set up a lab appt.   Start Wegovy (semaglutide)   0.25 mg once weekly for 4 weeks,   if tolerating increase to 0.5 mg weekly for 4 weeks,   if tolerating increase to 1 mg weekly    May use famotidine 20 mg twice daily as needed for nausea/heartburn when starting the medication.       Goals:  I recommend eating breakfast within an hour of waking up and eating every 4-6 hours during the day and try minimize snacking between meals. Prioritizing veggies and proteins for food choices is also recommended as medically appropriate.  Recommend 64oz (2L) water daily as " medically appropriate (6-8 glasses)  Recommend pursing regular exercise. 5 minutes of exercise every other day or every 2 days is a great place to start with aiming for 30 minutes 5 times a week as an end goal.  If you can, try to get some strength training twice weekly while losing weight to help preserve your muscle!        Follow up:    Call [phone] to schedule next visit in 4 months.    47 minutes spent on the date of the encounter doing chart review, history and exam, review test results, counseling, developing plan of care, documentation, and further activities as noted above.     Weight Related Co-morbidities:          I have the following health issues associated with obesity: HTN, had gestational DM   I have the following symptoms associated with obesity: Some joint issues that was weight related - some knee pain, unknown if weight related      Patient Active Problem List   Diagnosis    Encounter for triage in pregnant patient    Labor and delivery, indication for care    Chronic hypertension with superimposed preeclampsia    Chronic right-sided low back pain without sciatica    Chronic pain of right inguinal region    Chronic hypertension    Gestational diabetes    Hx of major depression    Class 2 severe obesity due to excess calories with serious comorbidity in adult (H)     Interested in medications for weight management.     Weight History    Previously had weight loss surgery: no   Age pt became overweight:   Since middle school high school   The following factors contributed to weight gain:    Life circumstances, mental health    I have tried the following methods to lose weight:   Changing diet and exercise    My lowest weight since age 20 was: 190 lbs   My highest weight since age 20 was: 260 lbs    The most weight I have ever lost was: (lbs) 50 lbs    Family hx of obesity: Yes    Are you interested in learning about weight loss surgery if appropriate?  Not primary interest   How has your  weight changed over the last year?         Diet Recall     Wake Up: 7am   Breakfast no   Lunch Sandwhich - 12:30pm, juice - pinneapple    Supper Mashed potatoe, hamburger, green beans - around 6:30pm and can be later w/kid schedules    Bedtime:   Snack between meals:   Snack in eveninpm,   Some times during day          Typical snacks:    Caloric beverages per day. What type:    Water consumed daily: L;ots    Low shanon/diet drinks:   Alcohol:   Foods you crave:                  Sleep History    How many hours do you sleep at night? Wants to monitor rather than a study.    Do you think you have sleep apnea?  unknown   Do you snore so loudly some one can hear you through a closed door? yes   Has anyone seen or heard you stop breathing during your sleep?  no   Do you often feel tired, fatigued, or sleepy during the day? no       Who does the grocery shopping? Usually self    Who does the cooking? Self and  can cook       Eating Disorder Screen    I binge eat no      I make myself vomit what I have eaten or use laxatives to get rid of food. no   I eat a large amount of food, like a loaf of bread, a box of cookies, a pint/quart of ice cream, all at once. no   I eat a large amount of food even when I am not hungry. no   I eat alone because I feel embarrassed and do not want others to see how much I have eaten. no   I eat until I am uncomfortably full. Sometimes    I feel bad, disgusted, or guilty after I overeat. No        Activity/Exercise History        How many times a week are you active for the purpose of exercise? For how long? none   What do you do for exercise?  Enjoys walking while listening to music, does have a gym in apartment building - needs more accountability - will ask next time!   What keeps you from being more active?        Work/Social History Reviewed With Patient    Employment status is: yes   Job is:  for [employer]    Is job active or sedentary? Sedentary    Marital  status?     Who do you live with?  , 2 kids    Do you have children? are they overweight? Young kids      Social History     Tobacco Use    Smoking status: Never    Smokeless tobacco: Never   Substance Use Topics    Alcohol use: Not Currently    Drug use: Never        Mental Health History Reviewed With Patient    How does your weight effect your mental health    How do you handle stress?  What coping techniques do you use?    Do you see a therapist?   Interested in a therapist - has pschiatrist.    Would you like to be connected with a therapist?      MEDICATIONS:   Current Outpatient Medications   Medication Sig Dispense Refill    amphetamine-dextroamphetamine (ADDERALL) 10 MG tablet Take 1 tablet by mouth 2 times daily      Semaglutide-Weight Management (WEGOVY) 0.25 MG/0.5ML pen Inject 0.25 mg Subcutaneous once a week For 4 weeks 2 mL 0    Semaglutide-Weight Management (WEGOVY) 0.5 MG/0.5ML pen Inject 0.5 mg Subcutaneous once a week 2 mL 1    Semaglutide-Weight Management (WEGOVY) 1 MG/0.5ML pen Inject 1 mg Subcutaneous once a week 2 mL 1    SLYND 4 MG TABS tablet TAKE 1 TABLET BY MOUTH AT THE SAME TIME EVERY DAY      Prenatal Vit-Fe Fumarate-FA (PRENATAL MULTIVITAMIN W/IRON) 27-0.8 MG tablet Take by mouth daily (Patient not taking: Reported on 1/31/2024)       No current facility-administered medications for this visit.        ROS:    HEENT  H/O glaucoma:  no  Cardiovascular  CAD:   no  Palpitations:   no  HTN:    Yes - fluctuates   Gastrointestinal  GERD:   no  Constipation:   No  Gastroparesis:  no  Liver Dz:   no  H/O Pancreatitis:  no  H/O Gallbladder Dz: no  Psychiatric  Anxiety:   Yes, not on medications - avoid anxiety increasing meds   Depression:  Yes - was on previously during pregnancy - no bothersome side effects   Bipolar:  no  H/O ETOH/Drug abuse no  H/O eating disorder: no  Endocrine  PMH/FMH of MTC or MEN2:  no  Neurologic:  H/O seizures:   no  Headaches:  no  Memory Impairment:  " Not now - some w/stress/ADHD    H/O kidney stones:  no  Kidney disease:  no  Current birth control:  Oral birth control     Labs/Records Reviewed:  Lab reviewed in [facility]    1/31/24 TSH WNL   CBC WNL  hgA12c WNL  CMP WNL     BP Readings from Last 6 Encounters:   06/03/24 (!) 141/100   01/31/24 126/80   07/06/23 121/75       Pulse Readings from Last 6 Encounters:   06/03/24 83   01/31/24 83   07/06/23 86        PHYSICAL EXAM:  BP (!) 141/100   Pulse 83   Resp 16   Ht 5' 8\" (1.727 m)   Wt 244 lb (110.7 kg)   SpO2 99%   BMI 37.10 kg/m    GENERAL: Healthy, alert and no distress  EYES: Eyes grossly normal to inspection.    RESP: No audible wheeze, cough, or visible cyanosis.  No increased work of breathing. Lungs clear to auscultation  Heart: regular rate and rhythm. No significant murmurs, rubs, or gallops  SKIN: Visible skin clear.   NEURO: Mentation and speech appropriate for age.  PSYCH: Mentation appears normal, affect normal/bright, judgement and insight intact, normal speech and appearance well-groomed.        COUNSELING:   Reviewed obesity as a chronic disease and comprehensive management stratagies.      We discussed Bariatric Basics including:  -eating 3 meals daily    Weight Management Tips Handout given in AVS      We discussed the importance of restorative sleep and stress management in maintaining a healthy weight.  We discussed insulin resistance and glycemic index as it relates to appetite and weight control.   We discussed the importance of physical activity including cardiovascular and strength training in maintaining a healthier weight and explored viable options.  Patient education of above written in AVS.      Sincerely,    Erica Ellison PA-C     "

## 2024-06-03 ENCOUNTER — TELEPHONE (OUTPATIENT)
Dept: SURGERY | Facility: CLINIC | Age: 39
End: 2024-06-03

## 2024-06-03 ENCOUNTER — OFFICE VISIT (OUTPATIENT)
Dept: SURGERY | Facility: CLINIC | Age: 39
End: 2024-06-03
Attending: INTERNAL MEDICINE
Payer: COMMERCIAL

## 2024-06-03 VITALS
RESPIRATION RATE: 16 BRPM | WEIGHT: 244 LBS | SYSTOLIC BLOOD PRESSURE: 141 MMHG | OXYGEN SATURATION: 99 % | BODY MASS INDEX: 36.98 KG/M2 | HEART RATE: 83 BPM | DIASTOLIC BLOOD PRESSURE: 100 MMHG | HEIGHT: 68 IN

## 2024-06-03 DIAGNOSIS — E66.01 CLASS 2 SEVERE OBESITY DUE TO EXCESS CALORIES WITH SERIOUS COMORBIDITY AND BODY MASS INDEX (BMI) OF 37.0 TO 37.9 IN ADULT (H): Primary | ICD-10-CM

## 2024-06-03 DIAGNOSIS — E66.812 CLASS 2 SEVERE OBESITY DUE TO EXCESS CALORIES WITH SERIOUS COMORBIDITY AND BODY MASS INDEX (BMI) OF 37.0 TO 37.9 IN ADULT (H): Primary | ICD-10-CM

## 2024-06-03 DIAGNOSIS — I10 CHRONIC HYPERTENSION: ICD-10-CM

## 2024-06-03 PROBLEM — O24.419 GESTATIONAL DIABETES: Status: ACTIVE | Noted: 2022-04-17

## 2024-06-03 PROBLEM — Z86.59 HX OF MAJOR DEPRESSION: Status: ACTIVE | Noted: 2023-01-31

## 2024-06-03 PROCEDURE — 99204 OFFICE O/P NEW MOD 45 MIN: CPT | Performed by: PHYSICIAN ASSISTANT

## 2024-06-03 PROCEDURE — 97802 MEDICAL NUTRITION INDIV IN: CPT

## 2024-06-03 RX ORDER — DEXTROAMPHETAMINE SACCHARATE, AMPHETAMINE ASPARTATE, DEXTROAMPHETAMINE SULFATE AND AMPHETAMINE SULFATE 2.5; 2.5; 2.5; 2.5 MG/1; MG/1; MG/1; MG/1
1 TABLET ORAL
COMMUNITY
Start: 2024-03-29

## 2024-06-03 RX ORDER — DROSPIRENONE 4 MG/1
TABLET, FILM COATED ORAL
COMMUNITY
Start: 2024-05-13

## 2024-06-03 RX ORDER — SEMAGLUTIDE 0.5 MG/.5ML
0.5 INJECTION, SOLUTION SUBCUTANEOUS WEEKLY
Qty: 2 ML | Refills: 1 | Status: SHIPPED | OUTPATIENT
Start: 2024-06-03

## 2024-06-03 RX ORDER — SEMAGLUTIDE 0.25 MG/.5ML
0.25 INJECTION, SOLUTION SUBCUTANEOUS WEEKLY
Qty: 2 ML | Refills: 0 | Status: SHIPPED | OUTPATIENT
Start: 2024-06-03

## 2024-06-03 RX ORDER — SEMAGLUTIDE 1 MG/.5ML
1 INJECTION, SOLUTION SUBCUTANEOUS WEEKLY
Qty: 2 ML | Refills: 1 | Status: SHIPPED | OUTPATIENT
Start: 2024-06-03

## 2024-06-03 NOTE — TELEPHONE ENCOUNTER
Prior Authorization Retail Medication Request    Medication/Dose: Wegovy 0.25, 0.5 and  mg doses.    ICD code (if different than what is on RX): Chronic hypertension [I10]       Previously Tried and Failed:  Diet and lifestyle  Rationale:  unable to use phentermine due to Adderall use  Wellbutrin caution use due to BP    Hx of obesity:   Ht: 5'8:  Body mass index is 37.10 kg/m .   Current Weight:  244 lb (110.7 kg)     Insurance Name: [facility]   Insurance ID:  [id number]       Pharmacy Information (if different than what is on RX)  [id number] [place], [place] - [facility] [address] [facility]   Phone:  [phone]

## 2024-06-03 NOTE — ASSESSMENT & PLAN NOTE
Initial MWM. Referred to dietitian. Ordered vitamin D for energy level screening. Referred for therapist and dietitian. Plan to try for Wegovy. Discussed mechanism of action, common side effects, titration guidelines, and  discussed risks for pancreatitis/gallbladder problems/gastroparesis/low sugars/MTC/MEN2. Medication handout given in AVS. Did discuss not FDA approved FV compounded semaglutide as back up option.

## 2024-06-03 NOTE — PATIENT INSTRUCTIONS
Gelacio Kohli-  Welcome to the [facility], [place]! It was great to visit with you and learn about your interest in weight loss/ your progress. Below are the goals we discussed.  Goals:  Schedule walking 3X per week for 15 minutes  Eat breakfast daily or have a protein drink  Criteria for selecting a protein drink/8-12 ounces:  < 250 calories  15-30 grams protein  < 10 grams total fat  < 10 grams sugar   Replace fruit juice with whole fruit  Thanks!  Bert Knox RD, LD  [facility], [place]

## 2024-06-03 NOTE — PATIENT INSTRUCTIONS
"Nice to talk with you today. Below is the plan discussed.-  Erica Ellison PA-C     Pt Instructions:  Referred for a therapist - they should call you to schedule.  Labs ordered.  Please call [phone] to set up a lab appt.   Start Wegovy (semaglutide)   0.25 mg once weekly for 4 weeks,   if tolerating increase to 0.5 mg weekly for 4 weeks,   if tolerating increase to 1 mg weekly    May use famotidine 20 mg twice daily as needed for nausea/heartburn when starting the medication.       Goals:  I recommend eating breakfast within an hour of waking up and eating every 4-6 hours during the day and try minimize snacking between meals. Prioritizing veggies and proteins for food choices is also recommended as medically appropriate.  Recommend 64oz (2L) water daily as medically appropriate (6-8 glasses)  Recommend pursing regular exercise. 5 minutes of exercise every other day or every 2 days is a great place to start with aiming for 30 minutes 5 times a week as an end goal.  If you can, try to get some strength training twice weekly while losing weight to help preserve your muscle!        Follow up:    Call [phone] to schedule next visit in 4 months.    WEGOVY (semaglutide)      Wegovy (semaglutide) injection 2.4 mg is an injectable prescription medicine used for adults with obesity (BMI ?30) or overweight (excess weight) (BMI ?27) who also have weight-related medical problems to help them lose weight and keep the weight off.  It is a GLP-1 agonist medication. GLP1 agonists stimulate the hormone GLP-1 in your body to allow you to feel full quickly and stay full longer.    Due to the shortage, You may need to be persistent and patient to get these initial dosages due to the shortage.  Once you are able to obtain the 0.25 and 0.5 mg dose \"8 weeks of therapy\" you can begin treatment.     Directions:  Start Wegovy (semaglutide) 0.25 mg once weekly for 4 weeks, then if tolerating increase to 0.5 mg weekly for 4 weeks, " then if tolerating increase to 1 mg weekly for 4 weeks, then if tolerating increase to 1.7 mg weekly for 4 weeks, then if tolerating increase to 2.4 mg weekly thereafter.      -Each Wegovy pen is a once weekly single-dose prefilled pen with a pen injector already built within the pen. Discard the Wegovy pen after use in sharps container.     Common Side Effects:    nausea, headache, diarrhea, stomach upset.  If these become unmanageable call or mychart.    Serious Side effects:   Pancreatitis (inflammation of the pancreas) has been associated with this type of medication, but is very rare.Symptoms of pancreatitis include: Pain in your upper stomach area which may travel to your back and be worse after eating.     Storage:   Store the prescription in the refrigerator. Once it is time to use the Wegovy pen, you can keep the pen at room temperature and it is good for up to 28 days at room temperature.     How to inject:  For a video on how to use the pen please go to:  https://www.[employer]/about-wegovy/how-to-use-the-wegovy-pen.html#itemTwo       For any questions or concerns please send a "[employer]" message to our team or call  [phone].  For emergencies please 911 or seek immediate medical care.     There is a small chance you may have some low blood sugar after taking the medication.   The signs of low blood sugar are:  Weakness  Shaky   Hungry  Sweating  Confusion      See below for ways to treat low blood sugar without adding in lots of extra calories.      Treating Low Blood Sugar    If you have symptoms of low blood sugar (sweating, shaking, dizzy, confused) eat 15 grams of carbs and wait 15 minutes:    Glucose Tabs are best for sugars under 70 -  Dex4 or BD Glucose tablets are good, you will need to take 3-4 of these to equal 15 grams.     One small box of raisins  4 oz fruit juice box or   cup fruit juice  1 small apple  1 small banana    cup canned fruit in water    English muffin or a slice of bread with  jelly   1 low fat frozen waffle with sugar-free syrup    cup cottage cheese with   cup frozen or fresh blueberries  1 cup skim or low-fat milk    cup whole grain cereal  4-6 crackers such as Triscuits      This medication is usually not covered by insurance and can be quite expensive. Sometimes a prior authorization is required, which may take up to 1-2 weeks for an insurance company to make a decision if they will cover the medication. Please be patient, you will be notified after a decision has been made.    Contact the nurse via [employer] or call [phone] if you have any questions or concerns. (Do not stop taking it if you don't think it's working. For some people it works without them knowing it.)     In order to get refills of this or any medication we prescribe you must be seen in the medical weight mgmt clinic every 2-4 months.    Using Your Wegovy Pen  Medicine (semaglutide)    Storing your pens  Store your pens in the refrigerator until you are ready to use them. Don't let them freeze.  Your pen may be stored at room temperature for 28 days or fewer. Just make sure the temperature doesn't get higher than 86 or lower than 46 degrees Fahrenheit.   Protect the pens from light.  Never use any pens that have .    Check your pen before use:  The liquid in the pen window should be clear and colorless. Bubbles are okay to see.   Do not use the pen if you can see the window contains any specks or it is cloudy or has changed color.  Make sure you have the medication and dose your health care provider prescribed.    Getting ready to inject:   1. Wash and dry your hands well.  2. Make sure the counter you use to place your supplies on is clean.  3. Make sure your injection time will not be interrupted by children or pets.  4. Have alcohol wipes or alcohol and cotton balls available to clean the injection site.   5. Choose your injection site. It can be on your stomach, back of upper arms, or upper legs.  Remember to change your injection site each time you inject. Try to be at least 1 inch away from the previous one. Stay at least 1 inch away from your belly button.     Inject your dose:  1. Pull off the grey cap off the pen. Throw it in the trash. Do not put the cap back on the pen.   2. Clean the injection site with alcohol.   3. Push the grey part of the pen firmly against your skin. This will start the injection.  4. When the pen is injecting, you will hear 2 clicks. The first click tells you the injection has started. The second one tells you the injection is continuing.   5. The injection is done when the yellow bar in the glass window at the bottom of the pen has stopped moving.   6. This injection is given 1 day a week. The pens come in  5 doses ranging from 0.25 mg to 2.4 mg. Each dose comes in a different color pen.  7. If you miss a dose, take is as soon as you remember. Do not take a missed dose, if it is within 2 days of the next dose.    8. Your injection may be best tolerated if given at night.     Disposing of your pens:  Dispose of your pens in a puncture-resistant container (hard plastic bottle) or Sharps container.  Check with the county you live in on how to dispose of the container.  Do not recycle the container with used pens.     Of note, you may not be able to  your medication right away. It may require a prior authorization from your insurance company. This may take a week or more.

## 2024-06-06 ENCOUNTER — TELEPHONE (OUTPATIENT)
Dept: SURGERY | Facility: CLINIC | Age: 39
End: 2024-06-06
Payer: COMMERCIAL

## 2024-06-06 NOTE — TELEPHONE ENCOUNTER
"Prior Authorization Retail Medication Request    Medication/Dose: Wegovy 1 mg  ICD code (if different than what is on RX):   [I10]; E66.09    Previously Tried and Failed:  Diet and exercise    Rationale:    Phentermine:   Caution blood pressure unable to use with Adderall, monitor blood pressure with nifedipine  Topiramate: Oral birth control.  Verify if taking a baby aspirin             GLP-1: Oral birth control.  wegovy/saxenda/zepbound do not appear covered                     Naltrexone:        Wellbutrin: Caution blood pressure prescribed bupropion 2/7/2024?             Hx of obesity    Ht:5'8\"  Body mass index is 37.10 kg/m .   Current Weight:  244 lb (110.7 kg)     Insurance Name:      [employer] [facility]     Insurance ID:  [id number]       Pharmacy Information (if different than what is on RX)  [facility]  [id number] [place], [place] - [facility] [address] AT [address]   Phone:  [phone]    "

## 2024-06-12 NOTE — TELEPHONE ENCOUNTER
Retail Pharmacy Prior Authorization Team   Phone: [phone]    PA Initiation    Medication: WEGOVY 0.25 MG/0.5ML SC SOAJ  Insurance Company: [employer] ([facility]) - Phone [phone] Fax [phone]  Pharmacy Filling the Rx: [facility] #[id number] [place] [address] [address] AT [address] OF [facility]  Filling Pharmacy Phone: [phone]  Filling Pharmacy Fax:    Start Date: 6/12/2024

## 2024-06-13 NOTE — TELEPHONE ENCOUNTER
PRIOR AUTHORIZATION DENIED    Medication: WEGOVY 0.25 MG/0.5ML SC SOAJ  Insurance Company: [employer] ([facility]) - Phone [phone] Fax [phone]  Denial Date: 6/12/2024  Denial Reason(s):           Appeal Information:         Patient Notified: No

## 2024-10-08 ENCOUNTER — OFFICE VISIT (OUTPATIENT)
Dept: SURGERY | Facility: CLINIC | Age: 39
End: 2024-10-08
Payer: COMMERCIAL

## 2024-10-08 NOTE — PATIENT INSTRUCTIONS
Nice to talk with you today. Below is the plan discussed.-  Erica Ellison PA-C     Pt Instructions:      Goals:      Follow up:    Call [phone] to schedule next visit in next available. Be in touch on Mychart for refills/concerns between visits

## 2025-01-15 ENCOUNTER — NURSE TRIAGE (OUTPATIENT)
Dept: FAMILY MEDICINE | Facility: CLINIC | Age: 40
End: 2025-01-15
Payer: COMMERCIAL

## 2025-01-15 NOTE — TELEPHONE ENCOUNTER
"Patient called in \"I think I'm getting a cold, because my  is ill\"    Bodyaches and sore throat. Denies all other symptoms.     Protocol is home care, RN gave patient advise on home care remedies  and recommend if symptoms get worse to go to . Patient agrees.    Jessy Alvarez RN    Reason for Disposition   Sore throat    Answer Assessment - Initial Assessment Questions  1. ONSET: \"When did the throat start hurting?\" (Hours or days ago)       2 days ago  2. SEVERITY: \"How bad is the sore throat?\" (Scale 1-10; mild, moderate or severe)      moderate  3. STREP EXPOSURE: \"Has there been any exposure to strep within the past week?\" If Yes, ask: \"What type of contact occurred?\"       no  4.  VIRAL SYMPTOMS: \"Are there any symptoms of a cold, such as a runny nose, cough, hoarse voice or red eyes?\"       no  5. FEVER: \"Do you have a fever?\" If Yes, ask: \"What is your temperature, how was it measured, and when did it start?\"      no  6. PUS ON THE TONSILS: \"Is there pus on the tonsils in the back of your throat?\"      no  7. OTHER SYMPTOMS: \"Do you have any other symptoms?\" (e.g., difficulty breathing, headache, rash)      Bodyache   8. PREGNANCY: \"Is there any chance you are pregnant?\" \"When was your last menstrual period?\"      no    Protocols used: Sore Throat-A-[facility]    "

## 2025-01-16 ENCOUNTER — VIRTUAL VISIT (OUTPATIENT)
Dept: FAMILY MEDICINE | Facility: CLINIC | Age: 40
End: 2025-01-16
Payer: COMMERCIAL

## 2025-01-16 DIAGNOSIS — J02.0 STREP PHARYNGITIS: Primary | ICD-10-CM

## 2025-01-16 RX ORDER — PENICILLIN V POTASSIUM 500 MG/1
500 TABLET, FILM COATED ORAL 2 TIMES DAILY
Qty: 20 TABLET | Refills: 0 | Status: SHIPPED | OUTPATIENT
Start: 2025-01-16 | End: 2025-01-26

## 2025-01-16 ASSESSMENT — ENCOUNTER SYMPTOMS: SORE THROAT: 1

## 2025-01-16 NOTE — PROGRESS NOTES
Mihaela is a 39 year old who is being evaluated via a billable video visit.    How would you like to obtain your AVS? MyChart  If the video visit is dropped, the invitation should be resent by: Text to cell phone: [phone]  Will anyone else be joining your video visit? No      Assessment & Plan     Strep pharyngitis  Likely strep as  just diagnosed positive. Will treat. Discussed home cares. Stay home for 24 hours   - penicillin V (VEETID) 500 MG tablet; Take 1 tablet (500 mg) by mouth 2 times daily for 10 days.          Subjective   Mihaela is a 39 year old, presenting for the following health issues:  Pharyngitis (Patient is having a virtual visit for bad sore throat.)    Pharyngitis     History of Present Illness       Reason for visit:  Sore throat  Symptom onset:  1-3 days ago  Symptoms include:  Feels like throat is burning.  Symptom intensity:  Severe  Symptom progression:  Worsening  Had these symptoms before:  No  What makes it worse:  Hard to drink and hursts to talk.  What makes it better:  Motrin helps a little bit.   She is taking medications regularly.     Symptoms started Tuesday morning with a severe sore throat   No fever but has body aches   Taking Ibuprofen   Still really hurts to swallow    just diagnosed with strep about an hour ago         Review of Systems  Detailed as above         Objective           Vitals:  No vitals were obtained today due to virtual visit.    Physical Exam   GENERAL: alert and no distress  EYES: Eyes grossly normal to inspection.  No discharge or erythema, or obvious scleral/conjunctival abnormalities.  RESP: No audible wheeze, cough, or visible cyanosis.    SKIN: Visible skin clear. No significant rash, abnormal pigmentation or lesions.  NEURO: Cranial nerves grossly intact.  Mentation and speech appropriate for age.  PSYCH: Appropriate affect, tone, and pace of words          Video-Visit Details    Type of service:  Video Visit   Originating Location  (pt. Location): Home    Distant Location (provider location):  On-site  Platform used for Video Visit: Doximanna  Signed Electronically by: BENJI Duque CNP

## 2025-03-16 ENCOUNTER — HEALTH MAINTENANCE LETTER (OUTPATIENT)
Age: 40
End: 2025-03-16